# Patient Record
Sex: MALE | NOT HISPANIC OR LATINO | Employment: UNEMPLOYED | URBAN - METROPOLITAN AREA
[De-identification: names, ages, dates, MRNs, and addresses within clinical notes are randomized per-mention and may not be internally consistent; named-entity substitution may affect disease eponyms.]

---

## 2018-11-08 PROBLEM — M54.16 LUMBAR RADICULOPATHY: Status: ACTIVE | Noted: 2017-06-01

## 2018-11-08 PROBLEM — G43.009 COMMON MIGRAINE: Status: ACTIVE | Noted: 2018-11-08

## 2018-11-08 PROBLEM — I10 HYPERTENSION: Status: ACTIVE | Noted: 2018-11-08

## 2018-11-08 PROBLEM — M51.36 DEGENERATION OF INTERVERTEBRAL DISC OF LUMBAR REGION: Status: ACTIVE | Noted: 2018-11-08

## 2018-11-08 PROBLEM — F31.9 BIPOLAR AFFECTIVE PSYCHOSIS (HCC): Status: ACTIVE | Noted: 2018-11-08

## 2018-11-08 PROBLEM — E66.9 OBESITY: Status: ACTIVE | Noted: 2018-11-08

## 2018-11-08 PROBLEM — F32.A DEPRESSIVE DISORDER: Status: ACTIVE | Noted: 2018-11-08

## 2018-11-08 PROBLEM — L30.9 ECZEMA: Status: ACTIVE | Noted: 2018-11-08

## 2018-11-08 PROBLEM — G47.30 SLEEP APNEA: Status: ACTIVE | Noted: 2018-11-08

## 2018-11-08 PROBLEM — M51.369 DEGENERATION OF INTERVERTEBRAL DISC OF LUMBAR REGION: Status: ACTIVE | Noted: 2018-11-08

## 2018-11-08 PROBLEM — K21.9 GASTROESOPHAGEAL REFLUX DISEASE: Status: ACTIVE | Noted: 2018-11-08

## 2019-01-08 ENCOUNTER — HOSPITAL ENCOUNTER (EMERGENCY)
Facility: HOSPITAL | Age: 34
Discharge: HOME | End: 2019-01-08
Attending: EMERGENCY MEDICINE | Admitting: EMERGENCY MEDICINE
Payer: COMMERCIAL

## 2019-01-08 VITALS
WEIGHT: 230 LBS | SYSTOLIC BLOOD PRESSURE: 111 MMHG | TEMPERATURE: 97.1 F | DIASTOLIC BLOOD PRESSURE: 57 MMHG | HEART RATE: 68 BPM | RESPIRATION RATE: 17 BRPM | OXYGEN SATURATION: 97 %

## 2019-01-08 DIAGNOSIS — R10.9 PAIN IN THE ABDOMEN: Primary | ICD-10-CM

## 2019-01-08 LAB
ALBUMIN SERPL BCP-MCNC: 4.2 G/DL (ref 3–5.2)
ALP SERPL-CCNC: 55 U/L (ref 43–122)
ALT SERPL W P-5'-P-CCNC: 22 U/L (ref 9–52)
ANION GAP SERPL CALCULATED.3IONS-SCNC: 9 MMOL/L (ref 5–14)
AST SERPL W P-5'-P-CCNC: 23 U/L (ref 17–59)
BASOPHILS # BLD AUTO: 0.1 THOUSANDS/ΜL (ref 0–0.1)
BASOPHILS NFR BLD AUTO: 1 % (ref 0–1)
BILIRUB SERPL-MCNC: 0.6 MG/DL
BILIRUB UR QL STRIP: NEGATIVE
BUN SERPL-MCNC: 8 MG/DL (ref 5–25)
CALCIUM SERPL-MCNC: 9.3 MG/DL (ref 8.4–10.2)
CHLORIDE SERPL-SCNC: 105 MMOL/L (ref 97–108)
CLARITY UR: CLEAR
CO2 SERPL-SCNC: 25 MMOL/L (ref 22–30)
COLOR UR: ABNORMAL
CREAT SERPL-MCNC: 0.88 MG/DL (ref 0.7–1.5)
EOSINOPHIL # BLD AUTO: 0 THOUSAND/ΜL (ref 0–0.4)
EOSINOPHIL NFR BLD AUTO: 1 % (ref 0–6)
ERYTHROCYTE [DISTWIDTH] IN BLOOD BY AUTOMATED COUNT: 13.9 %
GFR SERPL CREATININE-BSD FRML MDRD: 112 ML/MIN/1.73SQ M
GLUCOSE SERPL-MCNC: 111 MG/DL (ref 70–99)
GLUCOSE UR STRIP-MCNC: NEGATIVE MG/DL
HCT VFR BLD AUTO: 43 % (ref 41–53)
HGB BLD-MCNC: 14.1 G/DL (ref 13.5–17.5)
HGB UR QL STRIP.AUTO: NEGATIVE
KETONES UR STRIP-MCNC: ABNORMAL MG/DL
LEUKOCYTE ESTERASE UR QL STRIP: NEGATIVE
LIPASE SERPL-CCNC: 26 U/L (ref 23–300)
LYMPHOCYTES # BLD AUTO: 1.1 THOUSANDS/ΜL (ref 0.5–4)
LYMPHOCYTES NFR BLD AUTO: 20 % (ref 25–45)
MCH RBC QN AUTO: 28.4 PG (ref 26–34)
MCHC RBC AUTO-ENTMCNC: 32.8 G/DL (ref 31–36)
MCV RBC AUTO: 87 FL (ref 80–100)
MONOCYTES # BLD AUTO: 0.3 THOUSAND/ΜL (ref 0.2–0.9)
MONOCYTES NFR BLD AUTO: 6 % (ref 1–10)
NEUTROPHILS # BLD AUTO: 4.1 THOUSANDS/ΜL (ref 1.8–7.8)
NEUTS SEG NFR BLD AUTO: 73 % (ref 45–65)
NITRITE UR QL STRIP: NEGATIVE
PH UR STRIP.AUTO: 5 [PH] (ref 4.5–8)
PLATELET # BLD AUTO: 264 THOUSANDS/UL (ref 150–450)
PMV BLD AUTO: 7.6 FL (ref 8.9–12.7)
POTASSIUM SERPL-SCNC: 4.1 MMOL/L (ref 3.6–5)
PROT SERPL-MCNC: 7.6 G/DL (ref 5.9–8.4)
PROT UR STRIP-MCNC: NEGATIVE MG/DL
RBC # BLD AUTO: 4.97 MILLION/UL (ref 4.5–5.9)
SODIUM SERPL-SCNC: 139 MMOL/L (ref 137–147)
SP GR UR STRIP.AUTO: 1.02 (ref 1–1.04)
UROBILINOGEN UA: NEGATIVE MG/DL
WBC # BLD AUTO: 5.6 THOUSAND/UL (ref 4.5–11)

## 2019-01-08 PROCEDURE — 36415 COLL VENOUS BLD VENIPUNCTURE: CPT | Performed by: PHYSICIAN ASSISTANT

## 2019-01-08 PROCEDURE — 80053 COMPREHEN METABOLIC PANEL: CPT | Performed by: PHYSICIAN ASSISTANT

## 2019-01-08 PROCEDURE — 85025 COMPLETE CBC W/AUTO DIFF WBC: CPT | Performed by: PHYSICIAN ASSISTANT

## 2019-01-08 PROCEDURE — 99284 EMERGENCY DEPT VISIT MOD MDM: CPT

## 2019-01-08 PROCEDURE — 81003 URINALYSIS AUTO W/O SCOPE: CPT | Performed by: PHYSICIAN ASSISTANT

## 2019-01-08 PROCEDURE — 96360 HYDRATION IV INFUSION INIT: CPT

## 2019-01-08 PROCEDURE — 83690 ASSAY OF LIPASE: CPT | Performed by: PHYSICIAN ASSISTANT

## 2019-01-08 RX ORDER — IBUPROFEN 600 MG/1
600 TABLET ORAL EVERY 6 HOURS PRN
Qty: 30 TABLET | Refills: 0 | Status: SHIPPED | OUTPATIENT
Start: 2019-01-08

## 2019-01-08 RX ORDER — SODIUM CHLORIDE 9 MG/ML
250 INJECTION, SOLUTION INTRAVENOUS CONTINUOUS
Status: DISCONTINUED | OUTPATIENT
Start: 2019-01-08 | End: 2019-01-08 | Stop reason: HOSPADM

## 2019-01-08 RX ADMIN — SODIUM CHLORIDE 250 ML/HR: 9 INJECTION, SOLUTION INTRAVENOUS at 12:45

## 2019-01-08 NOTE — ED PROVIDER NOTES
History  Chief Complaint   Patient presents with    Flank Pain     2 months of constant right flank pain  no injury/trauma  no urinary symptoms  no recent vomiting or diarrhea  also states occasionally has blood on paper after wiping  History provided by:  Patient   used: No    Medical Problem   Location:  Pt with right abdomen /flank pain  for 2 months     Severity:  Mild  Onset quality:  Gradual  Duration:  2 months  Timing:  Constant  Progression:  Unchanged  Chronicity:  New  Associated symptoms: abdominal pain    Associated symptoms: no chest pain, no congestion, no cough, no diarrhea, no ear pain, no fatigue, no fever, no headaches, no loss of consciousness, no myalgias, no nausea, no rash, no rhinorrhea, no shortness of breath, no sore throat, no vomiting and no wheezing        Prior to Admission Medications   Prescriptions Last Dose Informant Patient Reported? Taking? cetirizine (ZyrTEC) 10 mg tablet   Yes No   Sig: every 24 hours   fluticasone (FLONASE) 50 mcg/act nasal spray   Yes No   Sig: inhale 2 spray by intranasal route  every day in each nostril      Facility-Administered Medications: None       Past Medical History:   Diagnosis Date    Bipolar affective psychosis ([facility] [address] )     Depression     Lumbar radiculopathy     Migraine     Obesity     Sleep apnea        Past Surgical History:   Procedure Laterality Date    APPENDECTOMY         History reviewed  No pertinent family history  I have reviewed and agree with the history as documented  Social History   Substance Use Topics    Smoking status: Light Tobacco Smoker     Types: Cigarettes    Smokeless tobacco: Never Used      Comment: 2 cigarettes per day    Alcohol use Yes      Comment: rarely        Review of Systems   Constitutional: Negative  Negative for fatigue and fever  HENT: Negative  Negative for congestion, ear pain, rhinorrhea and sore throat  Eyes: Negative  Respiratory: Negative  Negative for cough, shortness of breath and wheezing  Cardiovascular: Negative  Negative for chest pain  Gastrointestinal: Positive for abdominal pain  Negative for diarrhea, nausea and vomiting  Endocrine: Negative  Genitourinary: Negative  Musculoskeletal: Negative  Negative for myalgias  Skin: Negative  Negative for rash  Allergic/Immunologic: Negative  Neurological: Negative  Negative for loss of consciousness and headaches  Hematological: Negative  Psychiatric/Behavioral: Negative  All other systems reviewed and are negative  Physical Exam  Physical Exam   Constitutional: He is oriented to person, place, and time  He appears well-developed and well-nourished  Discussed option of other testing with pt he states he has to leave  Pt is pain free   Will recheck with family doctor  Will return for ct scan if condition worsens    HENT:   Head: Normocephalic  Right Ear: External ear normal    Left Ear: External ear normal    Nose: Nose normal    Mouth/Throat: Oropharynx is clear and moist    Eyes: Pupils are equal, round, and reactive to light  Conjunctivae and EOM are normal    Neck: Normal range of motion  Neck supple  Cardiovascular: Normal rate, regular rhythm, normal heart sounds and intact distal pulses  Pulmonary/Chest: Effort normal and breath sounds normal    Abdominal: Soft  Bowel sounds are normal    Right abd pain and ruq pain   No groin no testes pain    Musculoskeletal: Normal range of motion  Neurological: He is alert and oriented to person, place, and time  Skin: Skin is warm  Psychiatric: He has a normal mood and affect  His behavior is normal    Nursing note and vitals reviewed        Vital Signs  ED Triage Vitals   Temperature Pulse Respirations Blood Pressure SpO2   01/08/19 1205 01/08/19 1205 01/08/19 1205 01/08/19 1205 01/08/19 1205   (!) 97 1 °F (36 2 °C) 91 16 167/92 95 %      Temp Source Heart Rate Source Patient Position - Orthostatic VS BP Location FiO2 (%)   01/08/19 1205 01/08/19 1337 01/08/19 1205 01/08/19 1205 --   Tympanic Monitor Lying Left arm       Pain Score       01/08/19 1337       No Pain           Vitals:    01/08/19 1205 01/08/19 1337   BP: 167/92 111/57   Pulse: 91 68   Patient Position - Orthostatic VS: Lying Lying       Visual Acuity      ED Medications  Medications - No data to display    Diagnostic Studies  Results Reviewed     Procedure Component Value Units Date/Time    Lipase [[id number]]  (Normal) Collected:  01/08/19 1241    Lab Status:  Final result Specimen:  Blood from Arm, Right Updated:  01/08/19 1326     Lipase 26 u/L     Comprehensive metabolic panel [[id number]]  (Abnormal) Collected:  01/08/19 1241    Lab Status:  Final result Specimen:  Blood from Arm, Right Updated:  01/08/19 1326     Sodium 139 mmol/L      Potassium 4 1 mmol/L      Chloride 105 mmol/L      CO2 25 mmol/L      ANION GAP 9 mmol/L      BUN 8 mg/dL      Creatinine 0 88 mg/dL      Glucose 111 (H) mg/dL      Calcium 9 3 mg/dL      AST 23 U/L      ALT 22 U/L      Alkaline Phosphatase 55 U/L      Total Protein 7 6 g/dL      Albumin 4 2 g/dL      Total Bilirubin 0 60 mg/dL      eGFR 112 ml/min/1 73sq m     Narrative:         National Kidney Disease Education Program recommendations are as follows:  GFR calculation is accurate only with a steady state creatinine  Chronic Kidney disease less than 60 ml/min/1 73 sq  meters  Kidney failure less than 15 ml/min/1 73 sq  meters      UA w Reflex to Microscopic w Reflex to Culture [[id number]]  (Abnormal) Collected:  01/08/19 1241    Lab Status:  Final result Specimen:  Urine from Urine, Clean Catch Updated:  01/08/19 1308     Color, UA Preeti (A)     Clarity, UA Clear     Specific Gravity, UA 1 025     pH, UA 5 0     Leukocytes, UA Negative     Nitrite, UA Negative     Protein, UA Negative mg/dl      Glucose, UA Negative mg/dl      Ketones, UA 5 (Trace) (A) mg/dl      Bilirubin, UA Negative     Blood, UA Negative UROBILINOGEN UA Negative mg/dL     CBC and differential [[id number]]  (Abnormal) Collected:  01/08/19 1241    Lab Status:  Final result Specimen:  Blood from Arm, Right Updated:  01/08/19 1258     WBC 5 60 Thousand/uL      RBC 4 97 Million/uL      Hemoglobin 14 1 g/dL      Hematocrit 43 0 %      MCV 87 fL      MCH 28 4 pg      MCHC 32 8 g/dL      RDW 13 9 %      MPV 7 6 (L) fL      Platelets 082 Thousands/uL      Neutrophils Relative 73 (H) %      Lymphocytes Relative 20 (L) %      Monocytes Relative 6 %      Eosinophils Relative 1 %      Basophils Relative 1 %      Neutrophils Absolute 4 10 Thousands/µL      Lymphocytes Absolute 1 10 Thousands/µL      Monocytes Absolute 0 30 Thousand/µL      Eosinophils Absolute 0 00 Thousand/µL      Basophils Absolute 0 10 Thousands/µL                  No orders to display              Procedures  Procedures       Phone Contacts  ED Phone Contact    ED Course                               MDM  CritCare Time    Disposition  Final diagnoses:   Pain in the abdomen     Time reflects when diagnosis was documented in both MDM as applicable and the Disposition within this note     Time User Action Codes Description Comment    1/8/2019  1:30 PM Maurizio Side  Add [R10 9] Pain in the abdomen       ED Disposition     ED Disposition Condition Comment    Discharge  Ray Jones  discharge to home/self care      Condition at discharge: Good        Follow-up Information     Follow up With Specialties Details Why Contact Info    Cristian Masters MD Family Medicine Schedule an appointment as soon as possible for a visit  [address] [place] [address]  [phone]            Discharge Medication List as of 1/8/2019  1:30 PM      START taking these medications    Details   ibuprofen (MOTRIN) 600 mg tablet Take 1 tablet (600 mg total) by mouth every 6 (six) hours as needed (pain), Starting Tue 1/8/2019, Print         CONTINUE these medications which have NOT CHANGED    Details cetirizine (ZyrTEC) 10 mg tablet every 24 hours, Starting Wed 9/27/2017, Historical Med      fluticasone (FLONASE) 50 mcg/act nasal spray inhale 2 spray by intranasal route  every day in each nostril, Historical Med           No discharge procedures on file      ED Provider  Electronically Signed by           Elvis Toro PA-C  01/08/19 8156

## 2019-01-08 NOTE — DISCHARGE INSTRUCTIONS
Abdominal Pain   WHAT YOU NEED TO KNOW:   Abdominal pain can be dull, achy, or sharp  You may have pain in one area of your abdomen, or in your entire abdomen  Your pain may be caused by a condition such as constipation, food sensitivity or poisoning, infection, or a blockage  Abdominal pain can also be from a hernia, appendicitis, or an ulcer  Liver, gallbladder, or kidney conditions can also cause abdominal pain  The cause of your abdominal pain may be unknown  DISCHARGE INSTRUCTIONS:   Return to the emergency department if:   · You have new chest pain or shortness of breath  · You have pulsing pain in your upper abdomen or lower back that suddenly becomes constant  · Your pain is in the right lower abdominal area and worsens with movement  · You have a fever over 100 4°F (38°C) or shaking chills  · You are vomiting and cannot keep food or liquids down  · Your pain does not improve or gets worse over the next 8 to 12 hours  · You see blood in your vomit or bowel movements, or they look black and tarry  · Your skin or the whites of your eyes turn yellow  · You are a woman and have a large amount of vaginal bleeding that is not your monthly period  Contact your healthcare provider if:   · You have pain in your lower back  · You are a man and have pain in your testicles  · You have pain when you urinate  · You have questions or concerns about your condition or care  Follow up with your healthcare provider within 24 hours or as directed:  Write down your questions so you remember to ask them during your visits  Medicines:   · Medicines  may be given to calm your stomach and prevent vomiting or to decrease pain  Ask how to take pain medicine safely  · Take your medicine as directed  Contact your healthcare provider if you think your medicine is not helping or if you have side effects  Tell him of her if you are allergic to any medicine   Keep a list of the medicines, vitamins, and herbs you take  Include the amounts, and when and why you take them  Bring the list or the pill bottles to follow-up visits  Carry your medicine list with you in case of an emergency  © 2017 [employer] Information is for End User's use only and may not be sold, redistributed or otherwise used for commercial purposes  All illustrations and images included in CareNotes® are the copyrighted property of A [employer]  or [employer]  The above information is an  only  It is not intended as medical advice for individual conditions or treatments  Talk to your doctor, nurse or pharmacist before following any medical regimen to see if it is safe and effective for you

## 2019-12-08 ENCOUNTER — OFFICE VISIT (OUTPATIENT)
Dept: URGENT CARE | Age: 34
End: 2019-12-08

## 2019-12-08 VITALS
HEART RATE: 107 BPM | HEIGHT: 71 IN | RESPIRATION RATE: 18 BRPM | OXYGEN SATURATION: 97 % | SYSTOLIC BLOOD PRESSURE: 144 MMHG | WEIGHT: 260 LBS | DIASTOLIC BLOOD PRESSURE: 76 MMHG | TEMPERATURE: 98.5 F | BODY MASS INDEX: 36.4 KG/M2

## 2019-12-08 DIAGNOSIS — Z02.4 DRIVER'S PERMIT PHYSICAL EXAMINATION: Primary | ICD-10-CM

## 2019-12-08 NOTE — PROGRESS NOTES
Pt here for drivers permit physical   Pt has uncontrolled sleep apnea and uncontrolled bipolar disorder  He has not seen his PCP in over a year    I told patient that I cannot perform this exam at this time and he should follow up with PCP

## 2021-08-11 ENCOUNTER — TELEPHONE (OUTPATIENT)
Dept: PSYCHIATRY | Facility: CLINIC | Age: 36
End: 2021-08-11

## 2021-09-27 ENCOUNTER — OFFICE VISIT (OUTPATIENT)
Dept: FAMILY MEDICINE CLINIC | Facility: CLINIC | Age: 36
End: 2021-09-27

## 2021-09-27 VITALS
BODY MASS INDEX: 38.83 KG/M2 | WEIGHT: 262.2 LBS | HEIGHT: 69 IN | OXYGEN SATURATION: 95 % | HEART RATE: 97 BPM | TEMPERATURE: 97.1 F | DIASTOLIC BLOOD PRESSURE: 82 MMHG | SYSTOLIC BLOOD PRESSURE: 128 MMHG | RESPIRATION RATE: 20 BRPM

## 2021-09-27 DIAGNOSIS — Z02.4 DRIVER'S PERMIT PE (PHYSICAL EXAMINATION): ICD-10-CM

## 2021-09-27 DIAGNOSIS — J45.990 EXERCISE-INDUCED ASTHMA: ICD-10-CM

## 2021-09-27 DIAGNOSIS — Z13.6 SCREENING FOR CARDIOVASCULAR CONDITION: ICD-10-CM

## 2021-09-27 DIAGNOSIS — S99.921A FOOT INJURY, RIGHT, INITIAL ENCOUNTER: ICD-10-CM

## 2021-09-27 DIAGNOSIS — F17.200 TOBACCO DEPENDENCE: ICD-10-CM

## 2021-09-27 DIAGNOSIS — H61.21 IMPACTED CERUMEN, RIGHT EAR: ICD-10-CM

## 2021-09-27 DIAGNOSIS — Z13.1 SCREENING FOR DIABETES MELLITUS: ICD-10-CM

## 2021-09-27 DIAGNOSIS — Z00.00 ANNUAL PHYSICAL EXAM: Primary | ICD-10-CM

## 2021-09-27 PROCEDURE — G0439 PPPS, SUBSEQ VISIT: HCPCS | Performed by: NURSE PRACTITIONER

## 2021-09-27 RX ORDER — ALBUTEROL SULFATE 90 UG/1
2 AEROSOL, METERED RESPIRATORY (INHALATION) EVERY 6 HOURS PRN
Qty: 18 G | Refills: 3 | Status: SHIPPED | OUTPATIENT
Start: 2021-09-27

## 2021-09-27 NOTE — PROGRESS NOTES
[facility]    NAME: Varghese Miranda  AGE: 39 y o  SEX: male  : 1985     DATE: 2021     Assessment and Plan:     Problem List Items Addressed This Visit        Respiratory    Exercise-induced asthma     -start rescue inhaler, advised to use before activity  -pt feels he sometimes has "air stuck" in right lung, will obtain cxr         Relevant Medications    albuterol (Ventolin HFA) 90 mcg/act inhaler    Other Relevant Orders    XR chest pa & lateral       Other    BMI 38 0-38 9,adult     BMI above normal  Counseling and plan as documented below   -Pt acknowledges understanding and agrees to lifestyle changes   -Additional handouts on diet/exercise provided   -Will monitor progress at next scheduled follow-up  Relevant Orders    Basic metabolic panel    CBC and Platelet    Tobacco dependence     Current smoker  Discussed cessation as documented below  Pharmacotherapy offered and handout provided  Declines pharmacotherapy at this time  Foot injury, right, initial encounter     No overlying skin changes or signs of obvious trauma  Maintains full ROM  Pain localized to lateral foot     -obtain baseline xray  -refer to PT if no resolution, possible podiatry         Relevant Orders    XR foot 3+ vw right      Other Visit Diagnoses     Annual physical exam    -  Primary    Screening for diabetes mellitus        Relevant Orders    Hemoglobin A1C    Screening for cardiovascular condition        Relevant Orders    Lipid panel    's permit PE (physical examination)        Impacted cerumen, right ear        Relevant Medications    carbamide peroxide (DEBROX) 6 5 % otic solution          Immunizations and preventive care screenings were discussed with patient today  Appropriate education was printed on patient's after visit summary      Counseling:  Alcohol/drug use: discussed moderation in alcohol intake, the recommendations for healthy alcohol use, and avoidance of illicit drug use  Dental Health: discussed importance of regular tooth brushing, flossing, and dental visits  Injury prevention: discussed safety/seat belts, safety helmets, smoke detectors, carbon dioxide detectors, and smoking near bedding or upholstery  Sexual health: discussed sexually transmitted diseases, partner selection, use of condoms, avoidance of unintended pregnancy, and contraceptive alternatives  · Exercise: the importance of regular exercise/physical activity was discussed  Recommend exercise 3-5 times per week for at least 30 minutes  BMI Counseling: Body mass index is 38 72 kg/m²  The BMI is above normal  Nutrition recommendations include decreasing portion sizes, encouraging healthy choices of fruits and vegetables, decreasing fast food intake, consuming healthier snacks, limiting drinks that contain sugar, moderation in carbohydrate intake, increasing intake of lean protein, reducing intake of saturated and trans fat and reducing intake of cholesterol  Exercise recommendations include moderate physical activity 150 minutes/week, exercising 3-5 times per week and obtaining a gym membership  No pharmacotherapy was ordered  Rationale for BMI follow-up plan is due to patient being overweight or obese  Tobacco Cessation Counseling: Tobacco cessation counseling was provided  The patient is sincerely urged to quit consumption of tobacco  He is not ready to quit tobacco  Medication options discussed  Patient refused medication  Return in 1 year (on 9/27/2022)  Chief Complaint:     Chief Complaint   Patient presents with    New Patient Visit     40 y/o     Physical Exam     DMV     Hypertension     "as per pt he doesnt have high BP"       History of Present Illness:     Adult Annual Physical   Patient here for a comprehensive physical exam  The patient reports no problems     Patient states he is mainly here for 's permit physical   Patient denies any recent illnesses  He does endorse recent injury to his right foot, states he stepped on a stone which caused a shooting pain up his right foot and leg  Since then he has been able to bear weight and is wearing an ankle sleeve for support  He is worried about having broken a bone in his foot requests an x-ray  He also states recently when he goes to the gym and works out he gets short of breath and wheezing  He denies a past history of asthma  He denies any syncope, dizziness  He has no other concerns at this time  Diet and Physical Activity  · Diet/Nutrition: well balanced diet  · Exercise: 1-2 times a week on average  Depression Screening  PHQ-9 Depression Screening    PHQ-9:   Frequency of the following problems over the past two weeks:           General Health  · Sleep: sleeps well  · Hearing: normal - bilateral   · Vision: no vision problems  · Dental: regular dental visits  [facility]  · History of STDs?: no      Review of Systems:     Review of Systems   Constitutional: Negative  HENT: Negative  Eyes: Negative  Respiratory: Positive for chest tightness, shortness of breath and wheezing  Cardiovascular: Negative  Gastrointestinal: Negative  Genitourinary: Negative  Musculoskeletal: Negative  Right foot pain   Neurological: Negative  Psychiatric/Behavioral: Negative         Past Medical History:     Past Medical History:   Diagnosis Date    [facility] ([facility] [address] )     Depression     Lumbar radiculopathy     Migraine     Obesity     Sleep apnea       Past Surgical History:     Past Surgical History:   Procedure Laterality Date    APPENDECTOMY        Social History:     Social History     Socioeconomic History    Marital status: Single     Spouse name: None    Number of children: None    Years of education: None    Highest education level: None   Occupational History    None Tobacco Use    Smoking status: Light Tobacco Smoker     Types: Cigarettes    Smokeless tobacco: Never Used    Tobacco comment: 2 cigarettes per day   Substance and Sexual Activity    Alcohol use: Not Currently     Comment: rarely    Drug use: No    Sexual activity: None   Other Topics Concern    None   Social History Narrative    None     Social Determinants of Health     Financial Resource Strain:     Difficulty of Paying Living Expenses:    Food Insecurity:     Worried About Running Out of Food in the Last Year:     Ran Out of Food in the Last Year:    Transportation Needs:     Lack of Transportation (Medical):  Lack of Transportation (Non-Medical):    Physical Activity:     Days of Exercise per Week:     Minutes of Exercise per Session:    Stress:     Feeling of Stress :    Social Connections:     Frequency of Communication with Friends and Family:     Frequency of Social Gatherings with Friends and Family:     Attends Muslim Services:     Active Member of Clubs or Organizations:     Attends Club or Organization Meetings:     Marital Status:    Intimate Partner Violence:     Fear of Current or Ex-Partner:     Emotionally Abused:     Physically Abused:     Sexually Abused:       Family History:     No family history on file     Current Medications:     Current Outpatient Medications   Medication Sig Dispense Refill    albuterol (Ventolin HFA) 90 mcg/act inhaler Inhale 2 puffs every 6 (six) hours as needed for wheezing 18 g 3    carbamide peroxide (DEBROX) 6 5 % otic solution Administer 5 drops to the right ear 2 (two) times a day 15 mL 0    cetirizine (ZyrTEC) 10 mg tablet every 24 hours      fluticasone (FLONASE) 50 mcg/act nasal spray inhale 2 spray by intranasal route  every day in each nostril      ibuprofen (MOTRIN) 600 mg tablet Take 1 tablet (600 mg total) by mouth every 6 (six) hours as needed (pain) (Patient not taking: Reported on 12/8/2019) 30 tablet 0     No current facility-administered medications for this visit  Allergies: Allergies   Allergen Reactions    American Cockroach Allergy Skin Test     Cat Hair Extract     Dog Epithelium     Dog Epithelium Allergy Skin Test Other (See Comments)    Olmesartan     Shellfish Allergy - Food Allergy Other (See Comments)    Shellfish-Derived Products - Food Allergy       Physical Exam:     /82 (BP Location: Left arm, Patient Position: Sitting, Cuff Size: Adult)   Pulse 97   Temp (!) 97 1 °F (36 2 °C) (Temporal)   Resp 20   Ht 5' 9" (1 753 m)   Wt 119 kg (262 lb 3 2 oz)   SpO2 95%   BMI 38 72 kg/m²     Physical Exam  Vitals and nursing note reviewed  Constitutional:       Appearance: Normal appearance  He is well-developed  HENT:      Head: Normocephalic and atraumatic  Right Ear: External ear normal  There is impacted cerumen  Left Ear: Tympanic membrane, ear canal and external ear normal  There is no impacted cerumen  Nose: Nose normal       Mouth/Throat:      Mouth: Mucous membranes are moist    Eyes:      Extraocular Movements: Extraocular movements intact  Conjunctiva/sclera: Conjunctivae normal       Pupils: Pupils are equal, round, and reactive to light  Cardiovascular:      Rate and Rhythm: Normal rate and regular rhythm  Pulses: Normal pulses  Heart sounds: Normal heart sounds  No murmur heard  Pulmonary:      Effort: Pulmonary effort is normal  No respiratory distress  Breath sounds: Normal breath sounds  Abdominal:      Palpations: Abdomen is soft  Tenderness: There is no abdominal tenderness  Musculoskeletal:         General: Normal range of motion  Cervical back: Normal range of motion and neck supple  Skin:     General: Skin is warm and dry  Capillary Refill: Capillary refill takes less than 2 seconds  Neurological:      General: No focal deficit present        Mental Status: He is alert and oriented to person, place, and time    Psychiatric:         Mood and Affect: Mood normal          Behavior: Behavior normal           [address]

## 2021-09-27 NOTE — ASSESSMENT & PLAN NOTE
No overlying skin changes or signs of obvious trauma  Maintains full ROM    Pain localized to lateral foot     -obtain baseline xray  -refer to PT if no resolution, possible podiatry

## 2021-09-27 NOTE — ASSESSMENT & PLAN NOTE
-start rescue inhaler, advised to use before activity  -pt feels he sometimes has "air stuck" in right lung, will obtain cxr

## 2021-09-27 NOTE — PATIENT INSTRUCTIONS
Wellness Visit for Adults   AMBULATORY CARE:   A wellness visit  is when you see your healthcare provider to get screened for health problems  Your healthcare provider will also give you advice on how to stay healthy  Write down your questions so you remember to ask them  Ask your healthcare provider how often you should have a wellness visit  What happens at a wellness visit:  Your healthcare provider will ask about your health, and your family history of health problems  This includes high blood pressure, heart disease, and cancer  He or she will ask if you have symptoms that concern you, if you smoke, and about your mood  You may also be asked about your intake of medicines, supplements, food, and alcohol  Any of the following may be done:  · Your weight  will be checked  Your height may also be checked so your body mass index (BMI) can be calculated  Your BMI shows if you are at a healthy weight  · Your blood pressure  and heart rate will be checked  Your temperature may also be checked  · Blood and urine tests  may be done  Blood tests may be done to check your cholesterol levels  Abnormal cholesterol levels increase your risk for heart disease and stroke  You may also need a blood or urine test to check for diabetes if you are at increased risk  Urine tests may be done to look for signs of an infection or kidney disease  · A physical exam  includes checking your heartbeat and lungs with a stethoscope  Your healthcare provider may also check your skin to look for sun damage  · Screening tests  may be recommended  A screening test is done to check for diseases that may not cause symptoms  The screening tests you may need depend on your age, gender, family history, and lifestyle habits  For example, colorectal screening may be recommended if you are 48years old or older  Screening tests you need if you are a woman:   · A Pap smear  is used to screen for cervical cancer   Pap smears are usually done every 3 to 5 years depending on your age  You may need them more often if you have had abnormal Pap smear test results in the past  Ask your healthcare provider how often you should have a Pap smear  · A mammogram  is an x-ray of your breasts to screen for breast cancer  Experts recommend mammograms every 2 years starting at age 48 years  You may need a mammogram at age 52 years or younger if you have an increased risk for breast cancer  Talk to your healthcare provider about when you should start having mammograms and how often you need them  Vaccines you may need:   · Get an influenza vaccine  every year  The influenza vaccine protects you from the flu  Several types of viruses cause the flu  The viruses change over time, so new vaccines are made each year  · Get a tetanus-diphtheria (Td) booster vaccine  every 10 years  This vaccine protects you against tetanus and diphtheria  Tetanus is a severe infection that may cause painful muscle spasms and lockjaw  Diphtheria is a severe bacterial infection that causes a thick covering in the back of your mouth and throat  · Get a human papillomavirus (HPV) vaccine  if you are female and aged 23 to 32 or male 23 to 24 and never received it  This vaccine protects you from HPV infection  HPV is the most common infection spread by sexual contact  HPV may also cause vaginal, penile, and anal cancers  · Get a pneumococcal vaccine  if you are aged 72 years or older  The pneumococcal vaccine is an injection given to protect you from pneumococcal disease  Pneumococcal disease is an infection caused by pneumococcal bacteria  The infection may cause pneumonia, meningitis, or an ear infection  · Get a shingles vaccine  if you are 60 or older, even if you have had shingles before  The shingles vaccine is an injection to protect you from the varicella-zoster virus  This is the same virus that causes chickenpox   Shingles is a painful rash that develops in people who had chickenpox or have been exposed to the virus  How to eat healthy:  My Plate is a model for planning healthy meals  It shows the types and amounts of foods that should go on your plate  Fruits and vegetables make up about half of your plate, and grains and protein make up the other half  A serving of dairy is included on the side of your plate  The amount of calories and serving sizes you need depends on your age, gender, weight, and height  Examples of healthy foods are listed below:  · Eat a variety of vegetables  such as dark green, red, and orange vegetables  You can also include canned vegetables low in sodium (salt) and frozen vegetables without added butter or sauces  · Eat a variety of fresh fruits , canned fruit in 100% juice, frozen fruit, and dried fruit  · Include whole grains  At least half of the grains you eat should be whole grains  Examples include whole-wheat bread, wheat pasta, brown rice, and whole-grain cereals such as oatmeal     · Eat a variety of protein foods such as seafood (fish and shellfish), lean meat, and poultry without skin (turkey and chicken)  Examples of lean meats include pork leg, shoulder, or tenderloin, and beef round, sirloin, tenderloin, and extra lean ground beef  Other protein foods include eggs and egg substitutes, beans, peas, soy products, nuts, and seeds  · Choose low-fat dairy products such as skim or 1% milk or low-fat yogurt, cheese, and cottage cheese  · Limit unhealthy fats  such as butter, hard margarine, and shortening  Exercise:  Exercise at least 30 minutes per day on most days of the week  Some examples of exercise include walking, biking, dancing, and swimming  You can also fit in more physical activity by taking the stairs instead of the elevator or parking farther away from stores  Include muscle strengthening activities 2 days each week  Regular exercise provides many health benefits   It helps you manage your weight, and decreases your risk for type 2 diabetes, heart disease, stroke, and high blood pressure  Exercise can also help improve your mood  Ask your healthcare provider about the best exercise plan for you  General health and safety guidelines:   · Do not smoke  Nicotine and other chemicals in cigarettes and cigars can cause lung damage  Ask your healthcare provider for information if you currently smoke and need help to quit  E-cigarettes or smokeless tobacco still contain nicotine  Talk to your healthcare provider before you use these products  · Limit alcohol  A drink of alcohol is 12 ounces of beer, 5 ounces of wine, or 1½ ounces of liquor  · Lose weight, if needed  Being overweight increases your risk of certain health conditions  These include heart disease, high blood pressure, type 2 diabetes, and certain types of cancer  · Protect your skin  Do not sunbathe or use tanning beds  Use sunscreen with a SPF 15 or higher  Apply sunscreen at least 15 minutes before you go outside  Reapply sunscreen every 2 hours  Wear protective clothing, hats, and sunglasses when you are outside  · Drive safely  Always wear your seatbelt  Make sure everyone in your car wears a seatbelt  A seatbelt can save your life if you are in an accident  Do not use your cell phone when you are driving  This could distract you and cause an accident  Pull over if you need to make a call or send a text message  · Practice safe sex  Use latex condoms if are sexually active and have more than one partner  Your healthcare provider may recommend screening tests for sexually transmitted infections (STIs)  · Wear helmets, lifejackets, and protective gear  Always wear a helmet when you ride a bike or motorcycle, go skiing, or play sports that could cause a head injury  Wear protective equipment when you play sports  Wear a lifejacket when you are on a boat or doing water sports      © Copyright [employer] 2021 Information is for End User's use only and may not be sold, redistributed or otherwise used for commercial purposes  All illustrations and images included in CareNotes® are the copyrighted property of A D A M [employer] Inc  or Isatu Beard  The above information is an  only  It is not intended as medical advice for individual conditions or treatments  Talk to your doctor, nurse or pharmacist before following any medical regimen to see if it is safe and effective for you  Cigarette Smoking and Your Health   AMBULATORY CARE:   Risks to your health if you smoke:  Nicotine and other chemicals found in tobacco and e-cigarettes can damage every cell in your body  Even if you are a light smoker, you have an increased risk for cancer, heart disease, and lung disease  If you are pregnant or have diabetes, smoking increases your risk for complications  Nicotine can affect an adolescent's developing brain  This can lead to trouble thinking, learning, or paying attention  Benefits to your health if you stop smoking:   · You decrease respiratory symptoms such as coughing, wheezing, and shortness of breath  · You reduce your risk for cancers of the lung, mouth, throat, kidney, bladder, pancreas, stomach, and cervix  If you already have cancer, you increase the benefits of chemotherapy  You also reduce your risk for cancer returning or a second cancer from developing  · You reduce your risk for heart disease, blood clots, heart attack, and stroke  · You reduce your risk for lung infections, and diseases such as pneumonia, asthma, chronic bronchitis, and emphysema  · Your circulation improves  More oxygen can be delivered to your body  If you have diabetes, you lower your risk for complications, such as kidney, artery, and eye diseases  You also lower your risk for nerve damage  Nerve damage can lead to amputations, poor vision, and blindness      · You improve your body's ability to heal and to fight infections  · An adolescent can help his or her brain and body develop in a healthy way  Talk to your adolescent about all the health risks of nicotine  If you can, start talking about nicotine when your child is younger than 12 years  This may make it easier for him or her not to start using nicotine as a teenager or adult  Explain to him or her that it is best never to start  It can be hard to try to quit later  Benefits to the health of others if you stop smoking:  Tobacco is harmful to nonsmokers who breathe in your secondhand smoke  The following are ways the health of others around you may improve when you stop smoking:  · You lower the risks for lung cancer and heart disease in nonsmoking adults  · If you are pregnant, you lower the risk for miscarriage, early delivery, low birth weight, and stillbirth  You also lower your baby's risk for SIDS, obesity, developmental delay, and neurobehavioral problems, such as ADHD  · If you have children, you lower their risk for ear infections, colds, pneumonia, bronchitis, and asthma  Follow up with your doctor as directed:  Write down your questions so you remember to ask them during your visits  For support and more information:   · American [employer] Association  [address] [place] [address],[address]  [address]  Phone: [facility]  Phone: 0- 241 - 808-0105  Web Address: [employer]    · Smokefree  gov  Phone: 2- 050 - 919-7256  Web Address: www smokefree  [facility] [address] 2021 Information is for End User's use only and may not be sold, redistributed or otherwise used for commercial purposes  All illustrations and images included in CareNotes® are the copyrighted property of [employer]  or [address] [place] [address]  The above information is an  only  It is not intended as medical advice for individual conditions or treatments   Talk to your doctor, nurse or pharmacist before following any medical regimen to see if it is safe and effective for you  Cholesterol and Your Health   AMBULATORY CARE:   Cholesterol  is a waxy, fat-like substance  Your body uses cholesterol to make hormones and new cells, and to protect nerves  Cholesterol is made by your body  It also comes from certain foods you eat, such as meat and dairy products  Your healthcare provider can help you set goals for your cholesterol levels  He or she can help you create a plan to meet your goals  Cholesterol level goals: Your cholesterol level goals depend on your risk for heart disease, your age, and your other health conditions  The following are general guidelines:  · Total cholesterol  includes low-density lipoprotein (LDL), high-density lipoprotein (HDL), and triglyceride levels  The total cholesterol level should be lower than 200 mg/dL and is best at about 150 mg/dL  · LDL cholesterol  is called bad cholesterol  because it forms plaque in your arteries  As plaque builds up, your arteries become narrow, and less blood flows through  When plaque decreases blood flow to your heart, you may have chest pain  If plaque completely blocks an artery that brings blood to your heart, you may have a heart attack  Plaque can break off and form blood clots  Blood clots may block arteries in your brain and cause a stroke  The level should be less than 130 mg/dL and is best at about 100 mg/dL  · HDL cholesterol  is called good cholesterol  because it helps remove LDL cholesterol from your arteries  It does this by attaching to LDL cholesterol and carrying it to your liver  Your liver breaks down LDL cholesterol so your body can get rid of it  High levels of HDL cholesterol can help prevent a heart attack and stroke  Low levels of HDL cholesterol can increase your risk for heart disease, heart attack, and stroke  The level should be 60 mg/dL or higher  · Triglycerides  are a type of fat that store energy from foods you eat   High levels of triglycerides also cause plaque buildup  This can increase your risk for a heart attack or stroke  If your triglyceride level is high, your LDL cholesterol level may also be high  The level should be less than 150 mg/dL  Any of the following can increase your risk for high cholesterol:   · Smoking cigarettes    · Being overweight or obese, or not getting enough exercise    · Drinking large amounts of alcohol    · A medical condition such as hypertension (high blood pressure) or diabetes    · Certain genes passed from your parents to you    · Age older than 65 years    What you need to know about having your cholesterol levels checked: Adults 21to 39years of age should have their cholesterol levels checked every 4 to 6 years  Adults 45 years or older should have their cholesterol checked every 1 to 2 years  You may need your cholesterol checked more often, or at a younger age, if you have risk factors for heart disease  You may also need to have your cholesterol checked more often if you have other health conditions, such as diabetes  Blood tests are used to check cholesterol levels  Blood tests measure your levels of triglycerides, LDL cholesterol, and HDL cholesterol  How healthy fats affect your cholesterol levels:  Healthy fats, also called unsaturated fats, help lower LDL cholesterol and triglyceride levels  Healthy fats include the following:  · Monounsaturated fats  are found in foods such as olive oil, canola oil, avocado, nuts, and olives  · Polyunsaturated fats,  such as omega 3 fats, are found in fish, such as salmon, trout, and tuna  They can also be found in plant foods such as flaxseed, walnuts, and soybeans  How unhealthy fats affect your cholesterol levels:  Unhealthy fats increase LDL cholesterol and triglyceride levels  They are found in foods high in cholesterol, saturated fat, and trans fat:  · Cholesterol  is found in eggs, dairy, and meat      · Saturated fat  is found in butter, cheese, ice cream, whole milk, and coconut oil  Saturated fat is also found in meat, such as sausage, hot dogs, and bologna  · Trans fat  is found in liquid oils and is used in fried and baked foods  Foods that contain trans fats include chips, crackers, muffins, sweet rolls, microwave popcorn, and cookies  Treatment  for high cholesterol will also decrease your risk of heart disease, heart attack, and stroke  Treatment may include any of the following:  · Lifestyle changes  may include food, exercise, weight loss, and quitting smoking  You may also need to decrease the amount of alcohol you drink  Your healthcare provider will want you to start with lifestyle changes  Other treatment may be added if lifestyle changes are not enough  Your healthcare provider may recommend you work with a team to manage hyperlipidemia  The team may include medical experts such as a dietitian, an exercise or physical therapist, and a behavior therapist  Your family members may be included in helping you create lifestyle changes  · Medicines  may be given to lower your LDL cholesterol, triglyceride levels, or total cholesterol level  You may need medicines to lower your cholesterol if any of the following is true:    ? You have a history of stroke, TIA, unstable angina, or a heart attack  ? Your LDL cholesterol level is 190 mg/dL or higher  ? You are age 36 to 76 years, have diabetes or heart disease risk factors, and your LDL cholesterol is 70 mg/dL or higher  · Supplements  include fish oil, red yeast rice, and garlic  Fish oil may help lower your triglyceride and LDL cholesterol levels  It may also increase your HDL cholesterol level  Red yeast rice may help decrease your total cholesterol level and LDL cholesterol level  Garlic may help lower your total cholesterol level  Do not take any supplements without talking to your healthcare provider      Food changes you can make to lower your cholesterol levels:  A dietitian can help you create a healthy eating plan  He or she can show you how to read food labels and choose foods low in saturated fat, trans fats, and cholesterol  · Decrease the total amount of fat you eat  Choose lean meats, fat-free or 1% fat milk, and low-fat dairy products, such as yogurt and cheese  Try to limit or avoid red meats  Limit or do not eat fried foods or baked goods, such as cookies  · Replace unhealthy fats with healthy fats  Cook foods in olive oil or canola oil  Choose soft margarines that are low in saturated fat and trans fat  Seeds, nuts, and avocados are other examples of healthy fats  · Eat foods with omega-3 fats  Examples include salmon, tuna, mackerel, walnuts, and flaxseed  Eat fish 2 times per week  Pregnant women should not eat fish that have high levels of mercury, such as shark, swordfish, and jyoti mackerel  · Increase the amount of high-fiber foods you eat  High-fiber foods can help lower your LDL cholesterol  Aim to get between 20 and 30 grams of fiber each day  Fruits and vegetables are high in fiber  Eat at least 5 servings each day  Other high-fiber foods are whole-grain or whole-wheat breads, pastas, or cereals, and brown rice  Eat 3 ounces of whole-grain foods each day  Increase fiber slowly  You may have abdominal discomfort, bloating, and gas if you add fiber to your diet too quickly  · Eat healthy protein foods  Examples include low-fat dairy products, skinless chicken and turkey, fish, and nuts  · Limit foods and drinks that are high in sugar  Your dietitian or healthcare provider can help you create daily limits for high-sugar foods and drinks  The limit may be lower if you have diabetes or another health condition  Limits can also help you lose weight if needed  Lifestyle changes you can make to lower your cholesterol levels:   · Maintain a healthy weight  Ask your healthcare provider what a healthy weight is for you   Ask him or her to help you create a weight loss plan if needed  Weight loss can decrease your total cholesterol and triglyceride levels  Weight loss may also help keep your blood pressure at a healthy level  · Be physically active throughout the day  Physical activity, such as exercise, can help lower your total cholesterol level and maintain a healthy weight  Physical activity can also help increase your HDL cholesterol level  Work with your healthcare provider to create an program that is right for you  Get at least 30 to 40 minutes of moderate physical activity most days of the week  Examples of exercise include brisk walking, swimming, or biking  Also include strength training at least 2 times each week  Your healthcare providers can help you create a physical activity plan  · Do not smoke  Nicotine and other chemicals in cigarettes and cigars can raise your cholesterol levels  Ask your healthcare provider for information if you currently smoke and need help to quit  E-cigarettes or smokeless tobacco still contain nicotine  Talk to your healthcare provider before you use these products  · Limit or do not drink alcohol  Alcohol can increase your triglyceride levels  Ask your healthcare provider before you drink alcohol  Ask how much is okay for you to drink in 24 hours or 1 week  Follow up with your doctor as directed:  Write down your questions so you remember to ask them during your visits  © Copyright [employer] 2021 Information is for End User's use only and may not be sold, redistributed or otherwise used for commercial purposes  All illustrations and images included in CareNotes® are the copyrighted property of [employer]  or Isatu Paul   The above information is an  only  It is not intended as medical advice for individual conditions or treatments  Talk to your doctor, nurse or pharmacist before following any medical regimen to see if it is safe and effective for you

## 2022-08-29 ENCOUNTER — APPOINTMENT (OUTPATIENT)
Dept: RADIOLOGY | Facility: HOSPITAL | Age: 37
End: 2022-08-29
Payer: MEDICARE

## 2022-08-29 ENCOUNTER — HOSPITAL ENCOUNTER (EMERGENCY)
Facility: HOSPITAL | Age: 37
Discharge: HOME | End: 2022-08-29
Attending: EMERGENCY MEDICINE
Payer: MEDICARE

## 2022-08-29 ENCOUNTER — TELEPHONE (OUTPATIENT)
Dept: OTHER | Facility: OTHER | Age: 37
End: 2022-08-29

## 2022-08-29 VITALS
HEART RATE: 87 BPM | RESPIRATION RATE: 20 BRPM | SYSTOLIC BLOOD PRESSURE: 152 MMHG | TEMPERATURE: 99 F | HEIGHT: 71 IN | BODY MASS INDEX: 35.68 KG/M2 | WEIGHT: 254.85 LBS | DIASTOLIC BLOOD PRESSURE: 87 MMHG | OXYGEN SATURATION: 97 %

## 2022-08-29 DIAGNOSIS — S60.031A CONTUSION OF RIGHT MIDDLE FINGER WITHOUT DAMAGE TO NAIL, INITIAL ENCOUNTER: ICD-10-CM

## 2022-08-29 DIAGNOSIS — S60.10XA SUBUNGUAL HEMATOMA OF DIGIT OF HAND, INITIAL ENCOUNTER: ICD-10-CM

## 2022-08-29 DIAGNOSIS — S60.141A CONTUSION OF RIGHT RING FINGER WITH DAMAGE TO NAIL, INITIAL ENCOUNTER: Primary | ICD-10-CM

## 2022-08-29 PROCEDURE — 73130 X-RAY EXAM OF HAND: CPT

## 2022-08-29 PROCEDURE — 99283 EMERGENCY DEPT VISIT LOW MDM: CPT

## 2022-08-29 PROCEDURE — 96372 THER/PROPH/DIAG INJ SC/IM: CPT

## 2022-08-29 PROCEDURE — 99284 EMERGENCY DEPT VISIT MOD MDM: CPT | Performed by: EMERGENCY MEDICINE

## 2022-08-29 RX ORDER — KETOROLAC TROMETHAMINE 30 MG/ML
15 INJECTION, SOLUTION INTRAMUSCULAR; INTRAVENOUS ONCE
Status: COMPLETED | OUTPATIENT
Start: 2022-08-29 | End: 2022-08-29

## 2022-08-29 RX ADMIN — KETOROLAC TROMETHAMINE 15 MG: 30 INJECTION, SOLUTION INTRAMUSCULAR; INTRAVENOUS at 15:06

## 2022-08-29 NOTE — Clinical Note
Amada Mccullough was seen and treated in our emergency department on 8/29/2022  Diagnosis:     Brigid Mclean  is off the rest of the shift today, may return to work on return date  He may return on this date: 09/05/2022    Can return sooner if feeling okay      If you have any questions or concerns, please don't hesitate to call        Will MD Manuel    ______________________________           _______________          _______________  Hospital Representative                              Date                                Time

## 2022-08-29 NOTE — ED PROVIDER NOTES
History  Chief Complaint   Patient presents with    Hand Pain     Force applied to door of car and right 4th finger tip bruise looking and 3rd finger hurts; states happened yesterday  45 yo male presenting due to R 3rd and 4th finger pain, swelling and bruising  Pt states yesterday, he was lifting up a door to a car and his fingers got stuck on a navarro causing imediate pain  Pain described as throbbing, constant  Pt took 5 x 500 mg tylenol last night for his pain which helped  Promotes tingling of the fingers on dorsal and palmar aspect  Otherwise feels in his normal state of health  Prior to Admission Medications   Prescriptions Last Dose Informant Patient Reported? Taking? albuterol (Ventolin HFA) 90 mcg/act inhaler   No No   Sig: Inhale 2 puffs every 6 (six) hours as needed for wheezing      Facility-Administered Medications: None       Past Medical History:   Diagnosis Date    Bipolar affective psychosis ([facility] [address] )     Depression     Lumbar radiculopathy     Migraine     Obesity     Sleep apnea        Past Surgical History:   Procedure Laterality Date    APPENDECTOMY         History reviewed  No pertinent family history  I have reviewed and agree with the history as documented  E-Cigarette/Vaping    E-Cigarette Use Never User      E-Cigarette/Vaping Substances     Social History     Tobacco Use    Smoking status: Light Tobacco Smoker     Types: Cigarettes    Smokeless tobacco: Never Used    Tobacco comment: 2 cigarettes per day   Vaping Use    Vaping Use: Never used   Substance Use Topics    Alcohol use: Not Currently     Comment: rarely    Drug use: No        Review of Systems   Constitutional: Negative for chills and fever  HENT: Negative for ear pain and sore throat  Eyes: Negative for pain and visual disturbance  Respiratory: Negative for cough and shortness of breath  Cardiovascular: Negative for chest pain and palpitations     Gastrointestinal: Negative for abdominal pain and vomiting  Genitourinary: Negative for dysuria and hematuria  Musculoskeletal: Negative for arthralgias and back pain  Right 3rd and 4th finger pain, swelling, bruising, tingling   Skin: Negative for color change and rash  Neurological: Negative for seizures and syncope  All other systems reviewed and are negative  Physical Exam  ED Triage Vitals   Temperature Pulse Respirations Blood Pressure SpO2   08/29/22 1427 08/29/22 1427 08/29/22 1427 08/29/22 1427 08/29/22 1427   99 °F (37 2 °C) 87 20 152/87 97 %      Temp Source Heart Rate Source Patient Position - Orthostatic VS BP Location FiO2 (%)   08/29/22 1427 08/29/22 1427 08/29/22 1427 08/29/22 1427 --   Tympanic Monitor Sitting Left arm       Pain Score       08/29/22 1428       8             Orthostatic Vital Signs  Vitals:    08/29/22 1427   BP: 152/87   Pulse: 87   Patient Position - Orthostatic VS: Sitting       Physical Exam  Vitals and nursing note reviewed  Constitutional:       Appearance: He is well-developed  HENT:      Head: Normocephalic and atraumatic  Eyes:      Conjunctiva/sclera: Conjunctivae normal    Cardiovascular:      Rate and Rhythm: Normal rate and regular rhythm  Pulses: Normal pulses  Radial pulses are 2+ on the right side and 2+ on the left side  Heart sounds: Normal heart sounds  No murmur heard  Pulmonary:      Effort: Pulmonary effort is normal  No respiratory distress  Breath sounds: Normal breath sounds  Abdominal:      Palpations: Abdomen is soft  Tenderness: There is no abdominal tenderness  Musculoskeletal:      Cervical back: Neck supple  Comments: R 3rd and 4th finger pain near DIP and tip of fingers  R 4th finger: subungual hematoma  Decreased sensation in both fingers  Pt holding hand against gravity, partially flexed  Can flex, extend, abduct and adduct all 5 fingers  Radial pulse 2+  Skin:     General: Skin is warm and dry        Capillary Refill: Capillary refill takes less than 2 seconds  Neurological:      Mental Status: He is alert  ED Medications  Medications   ketorolac (TORADOL) injection 15 mg (15 mg Intramuscular Given 8/29/22 1146)       Diagnostic Studies  Results Reviewed     None                 XR hand 3+ views RIGHT    (Results Pending)         Procedures  Procedures      ED Course                             SBIRT 20yo+    Flowsheet Row Most Recent Value   SBIRT (25 yo +)    In order to provide better care to our patients, we are screening all of our patients for alcohol and drug use  Would it be okay to ask you these screening questions? Yes Filed at: 08/29/2022 1449   Initial Alcohol Screen: US AUDIT-C     1  How often do you have a drink containing alcohol? 0 Filed at: 08/29/2022 1449   2  How many drinks containing alcohol do you have on a typical day you are drinking? 0 Filed at: 08/29/2022 1449   3a  Male UNDER 65: How often do you have five or more drinks on one occasion? 0 Filed at: 08/29/2022 1449   Audit-C Score 0 Filed at: 08/29/2022 1449   OLIVIER: How many times in the past year have you    Used an illegal drug or used a prescription medication for non-medical reasons? Never Filed at: 08/29/2022 1449                MDM  Number of Diagnoses or Management Options  Contusion of right middle finger without damage to nail, initial encounter  Contusion of right ring finger with damage to nail, initial encounter  Subungual hematoma of digit of hand, initial encounter  Diagnosis management comments: 43-year-old male with right 3rd and 4th finger pain, swelling, bruising after his fingers were stuck on the latch of a door  X-ray right hand did not reveal any fractures or dislocations  4th finger underwent trephination with successful removal of subungual hematoma  Patient was given Toradol in the ED for pain  Follow-up with PCP  Ortho referral was also given  Strict return precautions         Amount and/or Complexity of Data Reviewed  Tests in the radiology section of CPT®: ordered and reviewed  Tests in the medicine section of CPT®: ordered and reviewed  Independent visualization of images, tracings, or specimens: yes    Risk of Complications, Morbidity, and/or Mortality  Presenting problems: low  Diagnostic procedures: low  Management options: low    Patient Progress  Patient progress: stable      Disposition  Final diagnoses:   Contusion of right ring finger with damage to nail, initial encounter   Contusion of right middle finger without damage to nail, initial encounter   Subungual hematoma of digit of hand, initial encounter - R ring/4th finger     Time reflects when diagnosis was documented in both MDM as applicable and the Disposition within this note     Time User Action Codes Description Comment    8/29/2022  3:15 PM Darlis Goldmann Add [S60 141A] Contusion of right ring finger with damage to nail, initial encounter     8/29/2022  3:15 PM Darlis Goldmann Add [S60 031A] Contusion of right middle finger without damage to nail, initial encounter     8/29/2022  3:15 PM Ras Joshi Add [S60 10XA] Subungual hematoma of digit of hand, initial encounter     8/29/2022  3:16 PM Ras Joshi Modify [S60 10XA] Subungual hematoma of digit of hand, initial encounter R ring/4th finger      ED Disposition     ED Disposition   Discharge    Condition   Stable    Date/Time   Mon Aug 29, 2022  3:17 PM    Comment   Diaz Avendano  discharge to home/self care                 Follow-up Information     Follow up With Specialties Details Why Contact Info Additional Information    MARY Guy Nurse Practitioner, Family Medicine Schedule an appointment as soon as possible for a visit  for follow up [address] [place] [phone]  [address] [place] [address] [facility] Schedule an appointment as soon as possible for a visit  schedule appointment with ortho if fingers do not get better [address] [phone]  [address] [place] [address] Specialists hospitals [facility], [address], [place], [employer] [facility]          Discharge Medication List as of 8/29/2022  3:17 PM      CONTINUE these medications which have NOT CHANGED    Details   albuterol (Ventolin HFA) 90 mcg/act inhaler Inhale 2 puffs every 6 (six) hours as needed for wheezing, Starting Mon 9/27/2021, Normal      carbamide peroxide (DEBROX) 6 5 % otic solution Administer 5 drops to the right ear 2 (two) times a day, Starting Mon 9/27/2021, Normal      cetirizine (ZyrTEC) 10 mg tablet every 24 hours, Starting Wed 9/27/2017, Historical Med      fluticasone (FLONASE) 50 mcg/act nasal spray inhale 2 spray by intranasal route  every day in each nostril, Historical Med      ibuprofen (MOTRIN) 600 mg tablet Take 1 tablet (600 mg total) by mouth every 6 (six) hours as needed (pain), Starting Tue 1/8/2019, Print           No discharge procedures on file  PDMP Review     None           ED Provider  Attending physically available and evaluated Inocenciosandro Feliz    RENETTA managed the patient along with the ED Attending      Electronically Signed by         Tegan Perales MD  08/29/22 0574

## 2022-09-21 ENCOUNTER — OFFICE VISIT (OUTPATIENT)
Dept: FAMILY MEDICINE CLINIC | Facility: CLINIC | Age: 37
End: 2022-09-21

## 2022-09-21 VITALS
DIASTOLIC BLOOD PRESSURE: 86 MMHG | RESPIRATION RATE: 19 BRPM | WEIGHT: 254 LBS | OXYGEN SATURATION: 97 % | HEIGHT: 71 IN | BODY MASS INDEX: 35.56 KG/M2 | SYSTOLIC BLOOD PRESSURE: 138 MMHG | HEART RATE: 95 BPM | TEMPERATURE: 96.9 F

## 2022-09-21 DIAGNOSIS — Z53.20 HIV SCREENING DECLINED: ICD-10-CM

## 2022-09-21 DIAGNOSIS — Z02.4 DRIVER'S PERMIT PHYSICAL EXAMINATION: ICD-10-CM

## 2022-09-21 DIAGNOSIS — E66.01 CLASS 2 SEVERE OBESITY DUE TO EXCESS CALORIES WITH SERIOUS COMORBIDITY AND BODY MASS INDEX (BMI) OF 35.0 TO 35.9 IN ADULT (HCC): ICD-10-CM

## 2022-09-21 DIAGNOSIS — Z53.20 SCREENING FOR HEPATITIS C DECLINED: ICD-10-CM

## 2022-09-21 DIAGNOSIS — G43.009 MIGRAINE WITHOUT AURA AND WITHOUT STATUS MIGRAINOSUS, NOT INTRACTABLE: ICD-10-CM

## 2022-09-21 DIAGNOSIS — F17.200 TOBACCO DEPENDENCE: ICD-10-CM

## 2022-09-21 DIAGNOSIS — F31.9 BIPOLAR AFFECTIVE DISORDER, REMISSION STATUS UNSPECIFIED (HCC): Primary | ICD-10-CM

## 2022-09-21 DIAGNOSIS — I10 HYPERTENSION, UNSPECIFIED TYPE: ICD-10-CM

## 2022-09-21 PROCEDURE — 99214 OFFICE O/P EST MOD 30 MIN: CPT

## 2022-09-21 NOTE — PROGRESS NOTES
[address] [address] PRACTICE [facility]    NAME: Tania Mark  AGE: 44 y o  SEX: male  : 1985     DATE: 2022     Assessment and Plan:     Problem List Items Addressed This Visit        Cardiovascular and Mediastinum    Common migraine     Occasional migraine with visual changes during headache  Triggered by heat/sun  Relieved by Tylenol    - Continue Tylenol PRN  Hypertension     BP at goal in office today: 138/86   - Currently diet controlled  - Continue low-salt diet and daily physical activity  Relevant Orders    CBC and differential    Comprehensive metabolic panel       Other    Bipolar affective psychosis ([facility] [address] ) - Primary     No current mental health treatment, states when he has talked about his symptoms in the past he has been labeled as "crazy" or "psycho " Admits to feelings of depression but denies ever having thoughts to hurt himself or others  Mentions being "spiritual" and being able to communicate with people who have   Does not appear to have active A/V hallucinations, no command hallucinations  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  States he sometimes considers treatment for the depression but declines referral at this time  - Discussed chronic nature of condition, warning signs    - Provided list of local mental health resources  Obesity     BMI Counseling: There is no height or weight on file to calculate BMI  The BMI is above normal  Nutrition recommendations include decreasing portion sizes, encouraging healthy choices of fruits and vegetables, limiting drinks that contain sugar and moderation in carbohydrate intake  Exercise recommendations include moderate physical activity 150 minutes/week  Rationale for BMI follow-up plan is due to patient being overweight or obese       - HgbA1c, lipid panel         Relevant Orders    Hemoglobin A1C    Lipid panel Tobacco dependence     Tobacco Cessation Counseling: Tobacco cessation counseling was provided  The patient is sincerely urged to quit consumption of tobacco  He is not ready to quit tobacco           's permit physical examination     Pt with no hx of neurological disorders, seizures, syncope or other loss of consciousness, DM, uncontrolled HTN, CV disorders, cognitive impairments, alcohol abuse, or drug abuse  In-office vision screening passed and no exam findings today that would preclude safe operation of a vehicle  Pt counseled regarding safe driving habits  Learner's Permit form signed  Screening for hepatitis C declined     Pt states he is not sexually active and has no risk factors  Declines screening  HIV screening declined     Pt states he is not sexually active and has no risk factors  Declines screening  BMI Counseling: There is no height or weight on file to calculate BMI  The BMI is above normal  Nutrition recommendations include decreasing portion sizes, encouraging healthy choices of fruits and vegetables, limiting drinks that contain sugar and moderation in carbohydrate intake  Exercise recommendations include moderate physical activity 150 minutes/week  Rationale for BMI follow-up plan is due to patient being overweight or obese  Tobacco Cessation Counseling: Tobacco cessation counseling was provided  The patient is sincerely urged to quit consumption of tobacco  He is not ready to quit tobacco          Return in about 6 months (around 3/21/2023) for Medicare Wellness  Chief Complaint:     Chief Complaint   Patient presents with    Physical Exam     DMV       History of Present Illness:     Concepcion Bae  presented to the office for completion of a 's License physical  Pt had a form completed by previous PCP about 1 year ago but never went to the [facility] due to a problem with his birth certificate from [place]          Review of Systems: Review of Systems   Constitutional: Negative for activity change, appetite change, fatigue, fever and unexpected weight change  Eyes: Negative for visual disturbance  Respiratory: Negative for cough, chest tightness, shortness of breath and wheezing  Cardiovascular: Negative for chest pain, palpitations and leg swelling  Gastrointestinal: Negative for abdominal pain, constipation, diarrhea, nausea and vomiting  Genitourinary: Negative for difficulty urinating  Neurological: Positive for headaches (migraines)  Negative for dizziness, seizures, syncope, weakness and numbness  Psychiatric/Behavioral: Positive for dysphoric mood  Negative for suicidal ideas  The patient is nervous/anxious  All other systems reviewed and are negative       Past Medical History:     Past Medical History:   Diagnosis Date    B[facility] affective psychosis ([facility] [address] )     Depression     Lumbar radiculopathy     Migraine     Obesity     Sleep apnea       Past Surgical History:     Past Surgical History:   Procedure Laterality Date    APPENDECTOMY        Social History:     Social History     Socioeconomic History    Marital status: Single     Spouse name: None    Number of children: None    Years of education: None    Highest education level: None   Occupational History    None   Tobacco Use    Smoking status: Light Tobacco Smoker     Types: Cigarettes    Smokeless tobacco: Never Used    Tobacco comment: 2 cigarettes per day   Vaping Use    Vaping Use: Never used   Substance and Sexual Activity    Alcohol use: Not Currently     Comment: rarely    Drug use: No    Sexual activity: None   Other Topics Concern    None   Social History Narrative    None     Social Determinants of Health     Financial Resource Strain: Low Risk     Difficulty of Paying Living Expenses: Not hard at all   Food Insecurity: No Food Insecurity    Worried About Running Out of Food in the Last Year: Never true    Lisset of Food in the Last Year: Never true   Transportation Needs: Unmet Transportation Needs    Lack of Transportation (Medical): Yes    Lack of Transportation (Non-Medical): Yes   Physical Activity: Not on file   Stress: Not on file   Social Connections: Not on file   Intimate Partner Violence: Not on file   Housing Stability: Not on file      Family History:     History reviewed  No pertinent family history  Current Medications:     No current outpatient medications on file  No current facility-administered medications for this visit  Allergies: Allergies   Allergen Reactions    American Cockroach Allergy Skin Test     Cat Hair Extract     Dog Epithelium     Dog Epithelium Allergy Skin Test Other (See Comments)    Olmesartan     Shellfish Allergy - Food Allergy Other (See Comments)    Shellfish-Derived Products - Food Allergy       Physical Exam:     /86 (BP Location: Right arm, Patient Position: Sitting, Cuff Size: Standard)   Pulse 95   Temp (!) 96 9 °F (36 1 °C) (Temporal)   Resp 19   Ht 5' 11" (1 803 m)   Wt 115 kg (254 lb)   SpO2 97%   BMI 35 43 kg/m²       Visual Acuity Screening    Right eye Left eye Both eyes   Without correction: 20/20 20/20 20/20   With correction:            Physical Exam  Vitals reviewed  Constitutional:       General: He is not in acute distress  Appearance: He is obese  He is not ill-appearing or diaphoretic  HENT:      Head: Normocephalic and atraumatic  Right Ear: External ear normal  There is impacted cerumen  Left Ear: External ear normal  There is impacted cerumen  Nose: Nose normal       Mouth/Throat:      Mouth: Mucous membranes are moist       Pharynx: Oropharynx is clear  Eyes:      General: Lids are normal       Extraocular Movements: Extraocular movements intact  Conjunctiva/sclera: Conjunctivae normal       Pupils: Pupils are equal, round, and reactive to light  Neck:      Thyroid: No thyromegaly or thyroid tenderness  Cardiovascular:      Rate and Rhythm: Normal rate and regular rhythm  Pulses: Normal pulses  Heart sounds: Normal heart sounds  No murmur heard  Pulmonary:      Effort: Pulmonary effort is normal  No tachypnea  Breath sounds: Normal breath sounds  No decreased breath sounds or wheezing  Abdominal:      General: Bowel sounds are normal  There is no distension  Palpations: Abdomen is soft  Tenderness: There is no abdominal tenderness  Musculoskeletal:      Right lower leg: No edema  Left lower leg: No edema  Lymphadenopathy:      Cervical: No cervical adenopathy  Skin:     General: Skin is warm and dry  Neurological:      Mental Status: He is alert and oriented to person, place, and time  Motor: Motor function is intact  Gait: Gait is intact  Psychiatric:         Attention and Perception: Attention normal          Mood and Affect: Mood and affect normal          Speech: Speech normal          Behavior: Behavior normal          Thought Content: Thought content is delusional  Thought content does not include homicidal or suicidal ideation            Eddie Bates 33

## 2022-09-22 PROBLEM — Z53.20 SCREENING FOR HEPATITIS C DECLINED: Status: ACTIVE | Noted: 2022-09-22

## 2022-09-22 PROBLEM — Z02.4 DRIVER'S PERMIT PHYSICAL EXAMINATION: Status: ACTIVE | Noted: 2022-09-22

## 2022-09-22 PROBLEM — Z53.20 HIV SCREENING DECLINED: Status: ACTIVE | Noted: 2022-09-22

## 2022-09-22 NOTE — ASSESSMENT & PLAN NOTE
Occasional migraine with visual changes during headache  Triggered by heat/sun  Relieved by Tylenol    - Continue Tylenol PRN

## 2022-09-22 NOTE — ASSESSMENT & PLAN NOTE
Tobacco Cessation Counseling: Tobacco cessation counseling was provided   The patient is sincerely urged to quit consumption of tobacco  He is not ready to quit tobacco

## 2022-09-22 NOTE — ASSESSMENT & PLAN NOTE
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

## 2022-09-22 NOTE — ASSESSMENT & PLAN NOTE
BP at goal in office today: 138/86   - Currently diet controlled  - Continue low-salt diet and daily physical activity

## 2022-09-22 NOTE — ASSESSMENT & PLAN NOTE
BMI Counseling: There is no height or weight on file to calculate BMI  The BMI is above normal  Nutrition recommendations include decreasing portion sizes, encouraging healthy choices of fruits and vegetables, limiting drinks that contain sugar and moderation in carbohydrate intake  Exercise recommendations include moderate physical activity 150 minutes/week  Rationale for BMI follow-up plan is due to patient being overweight or obese       - HgbA1c, lipid panel

## 2022-09-22 NOTE — ASSESSMENT & PLAN NOTE
Pt with no hx of neurological disorders, seizures, syncope or other loss of consciousness, DM, uncontrolled HTN, CV disorders, cognitive impairments, alcohol abuse, or drug abuse  In-office vision screening passed and no exam findings today that would preclude safe operation of a vehicle  Pt counseled regarding safe driving habits  Learner's Permit form signed

## 2022-11-07 ENCOUNTER — TELEPHONE (OUTPATIENT)
Dept: FAMILY MEDICINE CLINIC | Facility: CLINIC | Age: 37
End: 2022-11-07

## 2022-11-21 PROBLEM — Z02.4 DRIVER'S PERMIT PHYSICAL EXAMINATION: Status: RESOLVED | Noted: 2022-09-22 | Resolved: 2022-11-21

## 2022-12-13 ENCOUNTER — TELEPHONE (OUTPATIENT)
Dept: FAMILY MEDICINE CLINIC | Facility: CLINIC | Age: 37
End: 2022-12-13

## 2022-12-13 NOTE — TELEPHONE ENCOUNTER
Thanks  Generic COVID letter completed per protocol   If he does not meet the criteria listed on the letter, he must schedule a follow up for additional excuse

## 2022-12-15 ENCOUNTER — OFFICE VISIT (OUTPATIENT)
Dept: FAMILY MEDICINE CLINIC | Facility: CLINIC | Age: 37
End: 2022-12-15

## 2022-12-15 VITALS
HEART RATE: 88 BPM | RESPIRATION RATE: 18 BRPM | OXYGEN SATURATION: 98 % | SYSTOLIC BLOOD PRESSURE: 126 MMHG | WEIGHT: 260 LBS | BODY MASS INDEX: 36.4 KG/M2 | DIASTOLIC BLOOD PRESSURE: 80 MMHG | HEIGHT: 71 IN | TEMPERATURE: 97.4 F

## 2022-12-15 DIAGNOSIS — H61.23 BILATERAL IMPACTED CERUMEN: ICD-10-CM

## 2022-12-15 DIAGNOSIS — M25.531 RIGHT WRIST PAIN: ICD-10-CM

## 2022-12-15 DIAGNOSIS — K62.5 BRIGHT RED RECTAL BLEEDING: ICD-10-CM

## 2022-12-15 DIAGNOSIS — J45.990 EXERCISE-INDUCED ASTHMA: ICD-10-CM

## 2022-12-15 DIAGNOSIS — U07.1 COVID-19: Primary | ICD-10-CM

## 2022-12-15 RX ORDER — ALBUTEROL SULFATE 90 UG/1
2 AEROSOL, METERED RESPIRATORY (INHALATION) EVERY 6 HOURS PRN
Qty: 18 G | Refills: 0 | Status: SHIPPED | OUTPATIENT
Start: 2022-12-15

## 2022-12-15 NOTE — LETTER
December 15, 2022     Patient: Yessica Landa  YOB: 1985  Date of Visit: 12/15/2022      To Whom it May Concern:    Maura Rizzo is under my professional care  Carole Berg was seen in my office on 12/15/2022  Carole Berg may return to work on 12/19/2022  If you have any questions or concerns, please don't hesitate to call           Sincerely,            MARY Barth        CC: No Recipients

## 2022-12-15 NOTE — PROGRESS NOTES
Name: Sathya Salcedo  : 1985      MRN: [id number]  Encounter Provider: MARY Li  Encounter Date: 12/15/2022   Encounter department: [facility]    Assessment & Plan     1  COVID-19  Assessment & Plan:  Symptom onset 22, continues to have symptoms of chills, chest tightness, body aches, productive cough  - Symptomatic treatment  - Albuterol PRN  - Work note provided excusing pt from work tomorrow, to return on Monday  Orders:  -     albuterol (Ventolin HFA) 90 mcg/act inhaler; Inhale 2 puffs every 6 (six) hours as needed for wheezing or shortness of breath    2  Exercise-induced asthma  Assessment & Plan:  No SOB or wheezing at this time however COVID positive  - Refilled albuterol for PRN use  Orders:  -     albuterol (Ventolin HFA) 90 mcg/act inhaler; Inhale 2 puffs every 6 (six) hours as needed for wheezing or shortness of breath    3  Right wrist pain  Assessment & Plan:  No injury  Normal exam    - RICE plus diclofenac gel PRN  - Recheck in 4 weeks  Orders:  -     Diclofenac Sodium (VOLTAREN) 1 %; Apply 2 g topically 2 (two) times a day as needed (pain)    4  Bright red rectal bleeding  Assessment & Plan:  Painless BRRB noted with bowel movements  No abdominal pain or change in stools  Admits to intermittent constipation    - Increase fluids/fiber/fruits and vegetables in diet  - Trial hemorrhoid suppositories, follow up in 4 weeks  5  Bilateral impacted cerumen  Assessment & Plan:  - Peroxide solution BID x4 days prior to appt for flush  Subjective     HPI     Yariel presented to the office for c/o ongoing COVID symptoms  Onset 22, tested positive with home test  Symptoms include fever/chills, chest tightness, productive cough, body aches, headache, and swollen tonsils  Negative for SOB, wheezing, ear pain, N/V/D, or any other symptoms  Pt states he is hydrating and urinating normally  Last fever 12/10  Pt has been treating with soup, tea, and Tylenol  Pt also mentioned that he is having right wrist pain and rectal bleeding         Review of Systems   Constitutional: Positive for chills, fatigue and fever  Negative for unexpected weight change  HENT: Positive for sore throat  Negative for congestion, ear pain, rhinorrhea and trouble swallowing  Eyes: Negative for pain, redness and visual disturbance  Respiratory: Positive for cough and chest tightness  Negative for shortness of breath and wheezing  Cardiovascular: Negative for chest pain, palpitations and leg swelling  Gastrointestinal: Positive for anal bleeding and constipation  Negative for abdominal pain, blood in stool, diarrhea, nausea, rectal pain and vomiting  Genitourinary: Negative for decreased urine volume and difficulty urinating  Musculoskeletal: Positive for arthralgias (right wrist)  Neurological: Positive for headaches  Negative for dizziness  All other systems reviewed and are negative  Past Medical History:   Diagnosis Date   • Bipolar affective psychosis ([facility] [address] )    • Depression    • Lumbar radiculopathy    • Migraine    • Obesity    • Sleep apnea      Past Surgical History:   Procedure Laterality Date   • APPENDECTOMY       History reviewed  No pertinent family history    Social History     Socioeconomic History   • Marital status: Single     Spouse name: None   • Number of children: None   • Years of education: None   • Highest education level: None   Occupational History   • None   Tobacco Use   • Smoking status: Light Smoker     Types: Cigarettes   • Smokeless tobacco: Never   • Tobacco comments:     2 cigarettes per day   Vaping Use   • Vaping Use: Never used   Substance and Sexual Activity   • Alcohol use: Not Currently     Comment: rarely   • Drug use: No   • Sexual activity: None   Other Topics Concern   • None   Social History Narrative   • None     Social Determinants of Health     Financial Resource Strain: Low Risk    • Difficulty of Paying Living Expenses: Not hard at all   Food Insecurity: No Food Insecurity   • Worried About Running Out of Food in the Last Year: Never true   • Ran Out of Food in the Last Year: Never true   Transportation Needs: Unmet Transportation Needs   • Lack of Transportation (Medical): Yes   • Lack of Transportation (Non-Medical): Yes   Physical Activity: Not on file   Stress: Not on file   Social Connections: Not on file   Intimate Partner Violence: Not on file   Housing Stability: Not on file     No current outpatient medications on file prior to visit  Allergies   Allergen Reactions   • American Cockroach Allergy Skin Test    • Cat Hair Extract    • Dog Epithelium    • Dog Epithelium Allergy Skin Test Other (See Comments)   • Olmesartan    • Shellfish Allergy - Food Allergy Other (See Comments)   • Shellfish-Derived Products - Food Allergy      Immunization History   Administered Date(s) Administered   • Influenza Split 11/19/2012, 10/31/2013       Objective     /80 (BP Location: Left arm, Patient Position: Sitting, Cuff Size: Large)   Pulse 88   Temp (!) 97 4 °F (36 3 °C) (Temporal)   Resp 18   Ht 5' 11" (1 803 m)   Wt 118 kg (260 lb)   SpO2 98%   BMI 36 26 kg/m²     Physical Exam  Vitals reviewed  Constitutional:       General: He is not in acute distress  Appearance: He is obese  He is not ill-appearing or diaphoretic  HENT:      Head: Normocephalic and atraumatic  Right Ear: External ear normal  There is impacted cerumen  Left Ear: External ear normal  There is impacted cerumen  Nose: Nose normal       Mouth/Throat:      Mouth: Mucous membranes are moist       Pharynx: Oropharynx is clear  No oropharyngeal exudate or posterior oropharyngeal erythema  Tonsils: No tonsillar exudate  1+ on the right  1+ on the left     Eyes:      General: Lids are normal       Conjunctiva/sclera: Conjunctivae normal       Pupils: Pupils are equal, round, and reactive to light  Cardiovascular:      Rate and Rhythm: Normal rate and regular rhythm  Heart sounds: Normal heart sounds  No murmur heard  Pulmonary:      Effort: Pulmonary effort is normal  No tachypnea  Breath sounds: Normal breath sounds  No decreased breath sounds, wheezing, rhonchi or rales  Abdominal:      General: Bowel sounds are normal  There is no distension  Palpations: Abdomen is soft  Tenderness: There is no abdominal tenderness  There is no guarding or rebound  Musculoskeletal:      Right wrist: Tenderness present  No swelling, deformity or crepitus  Normal range of motion  Left wrist: Normal       Cervical back: Neck supple  Lymphadenopathy:      Cervical: No cervical adenopathy  Skin:     General: Skin is warm and dry  Neurological:      Mental Status: He is alert and oriented to person, place, and time     Psychiatric:         Mood and Affect: Mood and affect normal        MARY Bruner

## 2022-12-15 NOTE — PATIENT INSTRUCTIONS
Tylenol or Motrin for sore throat, body aches, or fever  1/4 tsp of salt in 8 oz warm water to gargle for sore throat  Use the peroxide solution morning and night to both ears for 4 days before appointment for ear flush

## 2022-12-22 PROBLEM — K62.5 BRIGHT RED RECTAL BLEEDING: Status: ACTIVE | Noted: 2022-12-22

## 2022-12-22 PROBLEM — H61.23 BILATERAL IMPACTED CERUMEN: Status: ACTIVE | Noted: 2022-12-22

## 2022-12-22 PROBLEM — U07.1 COVID-19: Status: ACTIVE | Noted: 2022-12-22

## 2022-12-22 PROBLEM — M25.531 RIGHT WRIST PAIN: Status: ACTIVE | Noted: 2022-12-22

## 2022-12-23 NOTE — ASSESSMENT & PLAN NOTE
Symptom onset 12/8/22, continues to have symptoms of chills, chest tightness, body aches, productive cough  - Symptomatic treatment  - Albuterol PRN  - Work note provided excusing pt from work tomorrow, to return on Monday

## 2022-12-23 NOTE — ASSESSMENT & PLAN NOTE
Painless BRRB noted with bowel movements  No abdominal pain or change in stools  Admits to intermittent constipation    - Increase fluids/fiber/fruits and vegetables in diet  - Trial hemorrhoid suppositories, follow up in 4 weeks

## 2023-01-09 ENCOUNTER — TELEPHONE (OUTPATIENT)
Dept: FAMILY MEDICINE CLINIC | Facility: CLINIC | Age: 38
End: 2023-01-09

## 2023-01-18 ENCOUNTER — PROCEDURE VISIT (OUTPATIENT)
Dept: FAMILY MEDICINE CLINIC | Facility: CLINIC | Age: 38
End: 2023-01-18

## 2023-01-18 VITALS
OXYGEN SATURATION: 98 % | HEIGHT: 71 IN | TEMPERATURE: 98.7 F | BODY MASS INDEX: 37.8 KG/M2 | RESPIRATION RATE: 16 BRPM | DIASTOLIC BLOOD PRESSURE: 80 MMHG | WEIGHT: 270 LBS | HEART RATE: 101 BPM | SYSTOLIC BLOOD PRESSURE: 122 MMHG

## 2023-01-18 DIAGNOSIS — K62.5 BRIGHT RED RECTAL BLEEDING: ICD-10-CM

## 2023-01-18 DIAGNOSIS — K64.9 HEMORRHOIDS, UNSPECIFIED HEMORRHOID TYPE: ICD-10-CM

## 2023-01-18 DIAGNOSIS — H61.23 BILATERAL IMPACTED CERUMEN: Primary | ICD-10-CM

## 2023-01-18 DIAGNOSIS — L21.0 DANDRUFF: ICD-10-CM

## 2023-01-18 RX ORDER — HYDROCORTISONE 25 MG/G
CREAM TOPICAL 2 TIMES DAILY
Qty: 28 G | Refills: 1 | Status: SHIPPED | OUTPATIENT
Start: 2023-01-18

## 2023-01-18 RX ORDER — SELENIUM SULFIDE 2.5 MG/100ML
LOTION TOPICAL 2 TIMES WEEKLY
Qty: 118 ML | Refills: 1 | Status: SHIPPED | OUTPATIENT
Start: 2023-01-19

## 2023-01-18 NOTE — PROGRESS NOTES
Name: Lacie Lee  : 1985      MRN: [id number]  Encounter Provider: MARY Choi  Encounter Date: 2023   Encounter department: [address] [place] [address]     1  Bilateral impacted cerumen  Assessment & Plan:  - Cerumen completely removed from right ear and partially removed from left via lavage   - Recommend 4 day treatment with Debrox on left  Orders:  -     carbamide peroxide (DEBROX) 6 5 % otic solution; Administer 5 drops into both ears 2 (two) times a day for 4 days (may repeat monthly)    2  Bright red rectal bleeding  Assessment & Plan:  Roshni Patel continues with BMs  No abdominal pain or change in stools  Reports had colonoscopy in past for same  Has not tried OTC hemorrhoid treatments  Pt again declined rectal exam   - Avoid constipation/straining   - Try hemorrhoid suppositories/cream   - Follow up for exam if no improvement  3  Hemorrhoids, unspecified hemorrhoid type  -     hydrocortisone (ANUSOL-HC) 2 5 % rectal cream; Apply topically 2 (two) times a day    4  Dandruff  Assessment & Plan:  - Selenium sulfide shampoo 2x weekly  Orders:  -     selenium sulfide (SELSUN) 2 5 % shampoo; Apply topically 2 (two) times a week         Subjective     HPI     Cesar Spine presented to the office for cerumen impaction/removal  Pt was not able to prepare for procedure due to cost of Debrox  Pt continues to report bright red rectal bleeding with BMs  Review of Systems   Constitutional: Negative for fatigue, fever and unexpected weight change  HENT: Positive for hearing loss  Negative for congestion, ear discharge, ear pain, sore throat and trouble swallowing  Eyes: Negative for visual disturbance  Respiratory: Negative for cough, chest tightness, shortness of breath and wheezing  Cardiovascular: Negative for chest pain and palpitations  Gastrointestinal: Positive for anal bleeding   Negative for abdominal pain, blood in stool, constipation, diarrhea, nausea and vomiting  Genitourinary: Negative for dysuria  Neurological: Negative for dizziness and headaches  All other systems reviewed and are negative  Past Medical History:   Diagnosis Date   • Bipolar affective psychosis ([facility] [address] )    • Depression    • Lumbar radiculopathy    • Migraine    • Obesity    • Sleep apnea      Past Surgical History:   Procedure Laterality Date   • APPENDECTOMY       History reviewed  No pertinent family history  Social History     Socioeconomic History   • Marital status: Single     Spouse name: None   • Number of children: None   • Years of education: None   • Highest education level: None   Occupational History   • None   Tobacco Use   • Smoking status: Light Smoker     Types: Cigarettes   • Smokeless tobacco: Never   • Tobacco comments:     2 cigarettes per day   Vaping Use   • Vaping Use: Never used   Substance and Sexual Activity   • Alcohol use: Not Currently     Comment: rarely   • Drug use: No   • Sexual activity: None   Other Topics Concern   • None   Social History Narrative   • None     Social Determinants of Health     Financial Resource Strain: Low Risk    • Difficulty of Paying Living Expenses: Not hard at all   Food Insecurity: No Food Insecurity   • Worried About Running Out of Food in the Last Year: Never true   • Ran Out of Food in the Last Year: Never true   Transportation Needs: Unmet Transportation Needs   • Lack of Transportation (Medical):  Yes   • Lack of Transportation (Non-Medical): Yes   Physical Activity: Not on file   Stress: Not on file   Social Connections: Not on file   Intimate Partner Violence: Not on file   Housing Stability: Not on file     Current Outpatient Medications on File Prior to Visit   Medication Sig   • albuterol (Ventolin HFA) 90 mcg/act inhaler Inhale 2 puffs every 6 (six) hours as needed for wheezing or shortness of breath   • Diclofenac Sodium (VOLTAREN) 1 % Apply 2 g topically 2 (two) times a day as needed (pain)     Allergies   Allergen Reactions   • American Cockroach Allergy Skin Test    • Cat Hair Extract    • Dog Epithelium    • Dog Epithelium Allergy Skin Test Other (See Comments)   • Olmesartan    • Shellfish Allergy - Food Allergy Other (See Comments)   • Shellfish-Derived Products - Food Allergy      Immunization History   Administered Date(s) Administered   • Influenza Split 11/19/2012, 10/31/2013       Objective     /80 (BP Location: Right arm, Patient Position: Sitting, Cuff Size: Large)   Pulse 101   Temp 98 7 °F (37 1 °C) (Temporal)   Resp 16   Ht 5' 11" (1 803 m)   Wt 122 kg (270 lb)   SpO2 98%   BMI 37 66 kg/m²     Physical Exam  Vitals reviewed  Constitutional:       General: He is not in acute distress  Appearance: He is obese  He is not ill-appearing or diaphoretic  HENT:      Head: Normocephalic and atraumatic  Right Ear: Tympanic membrane, ear canal and external ear normal  Tympanic membrane is not perforated, erythematous or bulging  Left Ear: External ear normal  There is impacted cerumen  Ears:      Comments: Normal TM visible on right after bilateral ear flush  Cerumen remains on left  Cardiovascular:      Rate and Rhythm: Normal rate and regular rhythm  Heart sounds: Normal heart sounds  No murmur heard  Pulmonary:      Effort: Pulmonary effort is normal  No tachypnea  Breath sounds: Normal breath sounds  No decreased breath sounds or wheezing  Skin:     General: Skin is warm and dry  Neurological:      Mental Status: He is alert and oriented to person, place, and time     Psychiatric:         Attention and Perception: Attention normal          Mood and Affect: Mood and affect normal          Speech: Speech normal          Behavior: Behavior normal          Ear cerumen removal    Date/Time: 1/18/2023 2:40 PM  Performed by: MARY Hendrix  Authorized by: MARY Hendrix   Universal Protocol:  Consent: Verbal consent obtained  Risks and benefits: risks, benefits and alternatives were discussed  Consent given by: patient  Patient understanding: patient states understanding of the procedure being performed      Patient location:  Clinic  Indications / Diagnosis:  Bilateral cerumen impaction  Procedure details:     Procedure type: irrigation only    Post-procedure details:     Complication:  None    Hearing quality:  Improved    Patient tolerance of procedure:   Tolerated well, no immediate complications        MARY Olivera

## 2023-01-18 NOTE — LETTER
January 18, 2023     Patient: Pavel Pérez  YOB: 1985  Date of Visit: 1/18/2023      To Whom it May Concern:    Scottheath Quinn is under my professional care  Cammie Santacruz was seen in my office on 1/18/2023  Cammie Santacruz may return to work on 1/18/2023  If you have any questions or concerns, please don't hesitate to call           Sincerely,              MARY Cueva        CC: No Recipients

## 2023-01-26 PROBLEM — L21.0 DANDRUFF: Status: ACTIVE | Noted: 2023-01-26

## 2023-01-26 NOTE — ASSESSMENT & PLAN NOTE
Roshni Blind continues with BMs  No abdominal pain or change in stools  Reports had colonoscopy in past for same  Has not tried OTC hemorrhoid treatments  Pt again declined rectal exam   - Avoid constipation/straining   - Try hemorrhoid suppositories/cream   - Follow up for exam if no improvement

## 2023-01-26 NOTE — ASSESSMENT & PLAN NOTE
- Cerumen completely removed from right ear and partially removed from left via lavage   - Recommend 4 day treatment with Debrox on left

## 2023-02-20 PROBLEM — H61.23 BILATERAL IMPACTED CERUMEN: Status: RESOLVED | Noted: 2022-12-22 | Resolved: 2023-02-20

## 2024-03-27 ENCOUNTER — OFFICE VISIT (OUTPATIENT)
Dept: DENTISTRY | Facility: CLINIC | Age: 39
End: 2024-03-27

## 2024-03-27 VITALS — TEMPERATURE: 97.8 F | DIASTOLIC BLOOD PRESSURE: 80 MMHG | HEART RATE: 80 BPM | SYSTOLIC BLOOD PRESSURE: 119 MMHG

## 2024-03-27 DIAGNOSIS — K02.9 CARIES: Primary | ICD-10-CM

## 2024-03-27 PROCEDURE — D0220 INTRAORAL - PERIAPICAL FIRST RADIOGRAPHIC IMAGE: HCPCS | Performed by: DENTIST

## 2024-03-27 PROCEDURE — D0230 INTRAORAL - PERIAPICAL EACH ADDITIONAL RADIOGRAPHIC IMAGE: HCPCS | Performed by: DENTIST

## 2024-03-27 PROCEDURE — D0270 BITEWING - SINGLE RADIOGRAPHIC IMAGE: HCPCS | Performed by: DENTIST

## 2024-03-27 PROCEDURE — D0140 LIMITED ORAL EVALUATION - PROBLEM FOCUSED: HCPCS | Performed by: DENTIST

## 2024-03-27 RX ORDER — AMOXICILLIN 500 MG/1
500 CAPSULE ORAL EVERY 8 HOURS SCHEDULED
Qty: 21 CAPSULE | Refills: 0 | Status: SHIPPED | OUTPATIENT
Start: 2024-03-27 | End: 2024-04-03

## 2024-03-27 NOTE — PROGRESS NOTES
Patient here for comp exam but would like us to address the pain ULQ and LLQ.  Las dental visit: few yrs ago  Referred from none  Chief complaint : pain > than 4 months ULQ LLQ.; teeth fractured for some time; now increasing pain LLQ.  ASA II  Pain Scale 3/ taking tylenol  Past medical history - HTN; Sleep Apnea  Medications see epic;  Allergies: environmental allergies and shellfish  Radiographs : L BW; PA #15 and 20/fractured Root tips.  # 18-MO caries; +PAP.  Extraoral exam nsg; no facial swelling noted.  Intraoral exam C/o TA ULQ #15 -fractured root tip                                               #20- fractured root.  Slight swelling noted ULQ/ hurts to chew.   #18- large MO caries near pupl- will need endo to restore; catching food. + PAP m root;   Gave pt options for #18 -would need Rct/Crown and possible crown lengthening; pt would like to know finances.?  Soft tissue exam : slight swelling noted around gum tissue #15.  Recommendation:1) referral for surgical extr #15 and #20.                                2) referral to endo #18                                3)Return for comp exam and tx plan.  NV- Cleaning and comp exam    Next visit  cleaning,Comp ex and tx plan

## 2024-07-09 ENCOUNTER — OFFICE VISIT (OUTPATIENT)
Dept: FAMILY MEDICINE CLINIC | Facility: CLINIC | Age: 39
End: 2024-07-09

## 2024-07-09 VITALS
OXYGEN SATURATION: 96 % | RESPIRATION RATE: 18 BRPM | WEIGHT: 257.8 LBS | BODY MASS INDEX: 38.18 KG/M2 | HEIGHT: 69 IN | TEMPERATURE: 98.4 F | DIASTOLIC BLOOD PRESSURE: 75 MMHG | SYSTOLIC BLOOD PRESSURE: 110 MMHG | HEART RATE: 88 BPM

## 2024-07-09 DIAGNOSIS — K59.01 SLOW TRANSIT CONSTIPATION: ICD-10-CM

## 2024-07-09 DIAGNOSIS — F32.A DEPRESSIVE DISORDER: ICD-10-CM

## 2024-07-09 DIAGNOSIS — F17.200 TOBACCO DEPENDENCE: ICD-10-CM

## 2024-07-09 DIAGNOSIS — F31.9 BIPOLAR AFFECTIVE DISORDER, REMISSION STATUS UNSPECIFIED (HCC): ICD-10-CM

## 2024-07-09 DIAGNOSIS — Z00.00 ENCOUNTER FOR ANNUAL WELLNESS VISIT (AWV) IN MEDICARE PATIENT: Primary | ICD-10-CM

## 2024-07-09 PROBLEM — U07.1 COVID-19: Status: RESOLVED | Noted: 2022-12-22 | Resolved: 2024-07-09

## 2024-07-09 PROBLEM — M25.531 RIGHT WRIST PAIN: Status: RESOLVED | Noted: 2022-12-22 | Resolved: 2024-07-09

## 2024-07-09 PROBLEM — E66.9 OBESITY: Status: RESOLVED | Noted: 2018-11-08 | Resolved: 2024-07-09

## 2024-07-09 PROBLEM — I10 HYPERTENSION: Status: RESOLVED | Noted: 2018-11-08 | Resolved: 2024-07-09

## 2024-07-09 PROBLEM — Z53.20 HIV SCREENING DECLINED: Status: RESOLVED | Noted: 2022-09-22 | Resolved: 2024-07-09

## 2024-07-09 PROBLEM — K62.5 BRIGHT RED RECTAL BLEEDING: Status: RESOLVED | Noted: 2022-12-22 | Resolved: 2024-07-09

## 2024-07-09 PROBLEM — S99.921A FOOT INJURY, RIGHT, INITIAL ENCOUNTER: Status: RESOLVED | Noted: 2021-09-27 | Resolved: 2024-07-09

## 2024-07-09 PROBLEM — Z53.20 SCREENING FOR HEPATITIS C DECLINED: Status: RESOLVED | Noted: 2022-09-22 | Resolved: 2024-07-09

## 2024-07-09 PROCEDURE — G0439 PPPS, SUBSEQ VISIT: HCPCS

## 2024-07-09 PROCEDURE — 99214 OFFICE O/P EST MOD 30 MIN: CPT

## 2024-07-09 RX ORDER — POLYETHYLENE GLYCOL 3350 17 G/17G
17 POWDER, FOR SOLUTION ORAL DAILY
Qty: 850 G | Refills: 0 | Status: SHIPPED | OUTPATIENT
Start: 2024-07-09

## 2024-07-09 NOTE — PROGRESS NOTES
Ambulatory Visit  Name: Nic Humphrey Jr.      : 1985      MRN: [id number]  Encounter Provider: MARY Long  Encounter Date: 2024   Encounter department: [facility]    Assessment & Plan   1. Encounter for annual wellness visit (AWV) in Medicare patient  2. BMI 38.0-38.9,adult  -     Basic metabolic panel; Future  -     Hemoglobin A1C; Future  -     Lipid Panel with Direct LDL reflex; Future  -     CBC and differential; Future  3. Bipolar affective disorder, remission status unspecified (HCC)  Assessment & Plan:  Currently not on any mental health treatment. Last seen years ago. He declines medication & psychotherapy at this time because he feels he is doing well.     4. Slow transit constipation  -     polyethylene glycol (GLYCOLAX) 17 GM/SCOOP powder; Take 17 g by mouth daily  5. Tobacco dependence  Assessment & Plan:  Currently smokes cigarettes about three times a week, about 3-4 cigarettes every time.  Tobacco Cessation Counseling: Tobacco cessation counseling and education was provided. The patient is sincerely urged to quit consumption of tobacco. He is not ready to quit tobacco. The numerous health risks of tobacco consumption were discussed. If he decides to quit, there are a number of helpful adjunctive aids, and he can see me to discuss nicotine replacement therapy, chantix, or bupropion anytime in the future.    6. Depressive disorder  Assessment & Plan:  PHQ-2/9 Depression Screening    Little interest or pleasure in doing things: 0 - not at all  Feeling down, depressed, or hopeless: 0 - not at all  Trouble falling or staying asleep, or sleeping too much: 0 - not at all  Feeling tired or having little energy: 2 - more than half the days  Poor appetite or overeatin - not at all  Feeling bad about yourself - or that you are a failure or have let yourself or your family down: 0 - not at all  Trouble concentrating on things, such as reading  the newspaper or watching television: 1 - several days  Moving or speaking so slowly that other people could have noticed. Or the opposite - being so fidgety or restless that you have been moving around a lot more than usual: 0 - not at all  Thoughts that you would be better off dead, or of hurting yourself in some way: 0 - not at all  PHQ-9 Score: 3  PHQ-9 Interpretation: No or Minimal depression      See bipolar affective psychosis     BMI Counseling: Body mass index is 38.07 kg/m². The BMI is above normal. Nutrition recommendations include decreasing portion sizes, encouraging healthy choices of fruits and vegetables, decreasing fast food intake, consuming healthier snacks, limiting drinks that contain sugar, moderation in carbohydrate intake, increasing intake of lean protein, reducing intake of saturated and trans fat and reducing intake of cholesterol. Exercise recommendations include moderate physical activity 150 minutes/week. No pharmacotherapy was ordered. Rationale for BMI follow-up plan is due to patient being overweight or obese.           Preventive health issues were discussed with patient, and age appropriate screening tests were ordered as noted in patient's After Visit Summary. Personalized health advice and appropriate referrals for health education or preventive services given if needed, as noted in patient's After Visit Summary.    History of Present Illness     Nic Humphrey . is a 39 y.o. male  has a past medical history of Bipolar affective psychosis (HCC), Depression, Lumbar radiculopathy, Migraine, Obesity, and Sleep apnea.  has a past surgical history that includes Appendectomy.    He is here for an AWV. Last OV was in January of 2023. He is here because he needed a height and weight for his job. He works as a  at [employer] and they are getting bullet proof vests.   He reports blood smear noted on toilet paper after having a bowel movement. Happening for several  years. Tx for hemorrhoids in the past but declined rectal exam at that time. Declines rectal exam today. Reports constipation. He has a bowel movement daily but strains and has difficulty every time. He eats a diet low in fiber, does not exercise, and does not drink 64 oz of water per day. Has not tried medication for constipation. Denies fevers, chills, weight loss, night sweats, unanticipated weight loss and appetite changes. No n/v/d/reflux.           Patient Care Team:  MARY Long as PCP - General (Family Medicine)    Review of Systems  As per [facility] in [place]    Medical History Reviewed by provider this encounter:  Tobacco  Allergies  Meds  Problems  Med Hx  Surg Hx  Fam Hx       Annual Wellness Visit Questionnaire       Health Risk Assessment:   Patient rates overall health as good. Patient feels that their physical health rating is slightly better. Patient is satisfied with their life. Eyesight was rated as same. Hearing was rated as same. Patient feels that their emotional and mental health rating is same. Patients states they are sometimes angry. Patient states they are often unusually tired/fatigued. Pain experienced in the last 7 days has been none. Patient states that he has experienced no weight loss or gain in last 6 months.     Depression Screening:   PHQ-9 Score: 3      Fall Risk Screening:   In the past year, patient has experienced: no history of falling in past year      Home Safety:  Patient does not have trouble with stairs inside or outside of their home. Patient has working smoke alarms and has working carbon monoxide detector. Home safety hazards include: none.     Nutrition:   Current diet is Regular.     Medications:   Patient is not currently taking any over-the-counter supplements. Patient is able to manage medications.     Activities of Daily Living (ADLs)/Instrumental Activities of Daily Living (IADLs):   Walk and transfer into and out of bed and chair?: Yes  Dress and groom  yourself?: Yes    Bathe or shower yourself?: Yes    Feed yourself? Yes  Do your laundry/housekeeping?: Yes  Manage your money, pay your bills and track your expenses?: Yes  Make your own meals?: Yes    Do your own shopping?: Yes    Previous Hospitalizations:   Any hospitalizations or ED visits within the last 12 months?: No      Advance Care Planning:   Living will: No    Durable POA for healthcare: No    Advanced directive: No      PREVENTIVE SCREENINGS        Prostate Cancer Screening:    General: Screening Not Indicated      Abdominal Aortic Aneurysm (AAA) Screening:    Risk factors include: tobacco use        Lung Cancer Screening:     General: Screening Not Indicated      Hepatitis C Screening:    General: Screening Current    Screening, Brief Intervention, and Referral to Treatment (SBIRT)    Screening      Single Item Drug Screening:  How often have you used an illegal drug (including marijuana) or a prescription medication for non-medical reasons in the past year? never    Single Item Drug Screen Score: 0  Interpretation: Negative screen for possible drug use disorder    Social Determinants of Health     Financial Resource Strain: Low Risk  (7/9/2024)    Overall Financial Resource Strain (CARDIA)     Difficulty of Paying Living Expenses: Not hard at all   Food Insecurity: No Food Insecurity (7/9/2024)    Hunger Vital Sign     Worried About Running Out of Food in the Last Year: Never true     Ran Out of Food in the Last Year: Never true   Transportation Needs: No Transportation Needs (7/9/2024)    PRAPARE - Transportation     Lack of Transportation (Medical): No     Lack of Transportation (Non-Medical): No   Housing Stability: Low Risk  (7/9/2024)    Housing Stability Vital Sign     Unable to Pay for Housing in the Last Year: No     Number of Times Moved in the Last Year: 1     Homeless in the Last Year: No   Utilities: Not At Risk (7/9/2024)    [facility] Utilities     Threatened with loss of utilities: No     No  "results found.    Objective     /75 (BP Location: Left arm, Patient Position: Sitting, Cuff Size: Standard)   Pulse 88   Temp 98.4 °F (36.9 °C) (Temporal)   Resp 18   Ht 5' 9\" (1.753 m)   Wt 117 kg (257 lb 12.8 oz)   SpO2 96%   BMI 38.07 kg/m²     Physical Exam  Vitals and nursing note reviewed.   Constitutional:       Appearance: He is well-developed. He is obese.   HENT:      Head: Normocephalic and atraumatic.      Right Ear: External ear normal.      Left Ear: External ear normal.      Nose: Nose normal.   Eyes:      Conjunctiva/sclera: Conjunctivae normal.   Cardiovascular:      Rate and Rhythm: Normal rate and regular rhythm.      Pulses: Normal pulses.      Heart sounds: Normal heart sounds. No murmur heard.  Pulmonary:      Effort: Pulmonary effort is normal. No respiratory distress.      Breath sounds: Normal breath sounds.   Abdominal:      General: Bowel sounds are normal.      Palpations: Abdomen is soft.      Tenderness: There is no abdominal tenderness.   Musculoskeletal:         General: Normal range of motion.      Cervical back: Normal range of motion and neck supple.   Skin:     General: Skin is warm and dry.      Capillary Refill: Capillary refill takes less than 2 seconds.   Neurological:      General: No focal deficit present.      Mental Status: He is alert and oriented to person, place, and time. Mental status is at baseline.   Psychiatric:         Mood and Affect: Mood normal.         Behavior: Behavior normal.         Thought Content: Thought content normal.         Judgment: Judgment normal.           "

## 2024-07-09 NOTE — PATIENT INSTRUCTIONS
Medicare Preventive Visit Patient Instructions  Thank you for completing your Welcome to Medicare Visit or Medicare Annual Wellness Visit today. Your next wellness visit will be due in one year (7/10/2025).  The screening/preventive services that you may require over the next 5-10 years are detailed below. Some tests may not apply to you based off risk factors and/or age. Screening tests ordered at today's visit but not completed yet may show as past due. Also, please note that scanned in results may not display below.  Preventive Screenings:  Service Recommendations Previous Testing/Comments   Colorectal Cancer Screening  Colonoscopy    Fecal Occult Blood Test (FOBT)/Fecal Immunochemical Test (FIT)  Fecal DNA/Cologuard Test  Flexible Sigmoidoscopy Age: 45-75 years old   Colonoscopy: every 10 years (May be performed more frequently if at higher risk)  OR  FOBT/FIT: every 1 year  OR  Cologuard: every 3 years  OR  Sigmoidoscopy: every 5 years  Screening may be recommended earlier than age 45 if at higher risk for colorectal cancer. Also, an individualized decision between you and your healthcare provider will decide whether screening between the ages of 76-85 would be appropriate. Colonoscopy: Not on file  FOBT/FIT: Not on file  Cologuard: Not on file  Sigmoidoscopy: Not on file          Prostate Cancer Screening Individualized decision between patient and health care provider in men between ages of 55-69   Medicare will cover every 12 months beginning on the day after your 50th birthday PSA: No results in last 5 years           Hepatitis C Screening Once for adults born between 1945 and 1965  More frequently in patients at high risk for Hepatitis C Hep C Antibody: Not on file        Diabetes Screening 1-2 times per year if you're at risk for diabetes or have pre-diabetes Fasting glucose: No results in last 5 years (No results in last 5 years)  A1C: No results in last 5 years (No results in last 5 years)       Cholesterol Screening Once every 5 years if you don't have a lipid disorder. May order more often based on risk factors. Lipid panel: Not on file         Other Preventive Screenings Covered by Medicare:  Abdominal Aortic Aneurysm (AAA) Screening: covered once if your at risk. You're considered to be at risk if you have a family history of AAA or a male between the age of 65-75 who smoking at least 100 cigarettes in your lifetime.  Lung Cancer Screening: covers low dose CT scan once per year if you meet all of the following conditions: (1) Age 55-77; (2) No signs or symptoms of lung cancer; (3) Current smoker or have quit smoking within the last 15 years; (4) You have a tobacco smoking history of at least 20 pack years (packs per day x number of years you smoked); (5) You get a written order from a healthcare provider.  Glaucoma Screening: covered annually if you're considered high risk: (1) You have diabetes OR (2) Family history of glaucoma OR (3)  aged 50 and older OR (4)  American aged 65 and older  Osteoporosis Screening: covered every 2 years if you meet one of the following conditions: (1) Have a vertebral abnormality; (2) On glucocorticoid therapy for more than 3 months; (3) Have primary hyperparathyroidism; (4) On osteoporosis medications and need to assess response to drug therapy.  HIV Screening: covered annually if you're between the age of 15-65. Also covered annually if you are younger than 15 and older than 65 with risk factors for HIV infection. For pregnant patients, it is covered up to 3 times per pregnancy.    Immunizations:  Immunization Recommendations   Influenza Vaccine Annual influenza vaccination during flu season is recommended for all persons aged >= 6 months who do not have contraindications   Pneumococcal Vaccine   * Pneumococcal conjugate vaccine = PCV13 (Prevnar 13), PCV15 (Vaxneuvance), PCV20 (Prevnar 20)  * Pneumococcal polysaccharide vaccine = PPSV23  (Pneumovax) Adults 19-65 yo with certain risk factors or if 65+ yo  If never received any pneumonia vaccine: recommend Prevnar 20 (PCV20)  Give PCV20 if previously received 1 dose of PCV13 or PPSV23   Hepatitis B Vaccine 3 dose series if at intermediate or high risk (ex: diabetes, end stage renal disease, liver disease)   Respiratory syncytial virus (RSV) Vaccine - COVERED BY MEDICARE PART D  * RSVPreF3 (Arexvy) CDC recommends that adults 60 years of age and older may receive a single dose of RSV vaccine using shared clinical decision-making (SCDM)   Tetanus (Td) Vaccine - COST NOT COVERED BY MEDICARE PART B Following completion of primary series, a booster dose should be given every 10 years to maintain immunity against tetanus. Td may also be given as tetanus wound prophylaxis.   Tdap Vaccine - COST NOT COVERED BY MEDICARE PART B Recommended at least once for all adults. For pregnant patients, recommended with each pregnancy.   Shingles Vaccine (Shingrix) - COST NOT COVERED BY MEDICARE PART B  2 shot series recommended in those 19 years and older who have or will have weakened immune systems or those 50 years and older     Health Maintenance Due:      Topic Date Due   • Hepatitis C Screening  Never done   • HIV Screening  Never done     Immunizations Due:      Topic Date Due   • Pneumococcal Vaccine: Pediatrics (0 to 5 Years) and At-Risk Patients (6 to 64 Years) (1 of 2 - PCV) Never done   • Hepatitis A Vaccine (1 of 2 - Risk 2-dose series) Never done   • COVID-19 Vaccine (1 - 2023-24 season) Never done   • Influenza Vaccine (1) 09/01/2024     Advance Directives   What are advance directives?  Advance directives are legal documents that state your wishes and plans for medical care. These plans are made ahead of time in case you lose your ability to make decisions for yourself. Advance directives can apply to any medical decision, such as the treatments you want, and if you want to donate organs.   What are the  types of advance directives?  There are many types of advance directives, and each state has rules about how to use them. You may choose a combination of any of the following:  Living will:  This is a written record of the treatment you want. You can also choose which treatments you do not want, which to limit, and which to stop at a certain time. This includes surgery, medicine, IV fluid, and tube feedings.   Durable power of  for healthcare (DPAHC):  This is a written record that states who you want to make healthcare choices for you when you are unable to make them for yourself. This person, called a proxy, is usually a family member or a friend. You may choose more than 1 proxy.  Do not resuscitate (DNR) order:  A DNR order is used in case your heart stops beating or you stop breathing. It is a request not to have certain forms of treatment, such as CPR. A DNR order may be included in other types of advance directives.  Medical directive:  This covers the care that you want if you are in a coma, near death, or unable to make decisions for yourself. You can list the treatments you want for each condition. Treatment may include pain medicine, surgery, blood transfusions, dialysis, IV or tube feedings, and a ventilator (breathing machine).  Values history:  This document has questions about your views, beliefs, and how you feel and think about life. This information can help others choose the care that you would choose.  Why are advance directives important?  An advance directive helps you control your care. Although spoken wishes may be used, it is better to have your wishes written down. Spoken wishes can be misunderstood, or not followed. Treatments may be given even if you do not want them. An advance directive may make it easier for your family to make difficult choices about your care.   Cigarette Smoking and Your Health   Risks to your health if you smoke:  Nicotine and other chemicals found in  tobacco damage every cell in your body. Even if you are a light smoker, you have an increased risk for cancer, heart disease, and lung disease. If you are pregnant or have diabetes, smoking increases your risk for complications.   Benefits to your health if you stop smoking:   You decrease respiratory symptoms such as coughing, wheezing, and shortness of breath.   You reduce your risk for cancers of the lung, mouth, throat, kidney, bladder, pancreas, stomach, and cervix. If you already have cancer, you increase the benefits of chemotherapy. You also reduce your risk for cancer returning or a second cancer from developing.   You reduce your risk for heart disease, blood clots, heart attack, and stroke.   You reduce your risk for lung infections, and diseases such as pneumonia, asthma, chronic bronchitis, and emphysema.  Your circulation improves. More oxygen can be delivered to your body. If you have diabetes, you lower your risk for complications, such as kidney, artery, and eye diseases. You also lower your risk for nerve damage. Nerve damage can lead to amputations, poor vision, and blindness.  You improve your body's ability to heal and to fight infections.  For more information and support to stop smoking:   [employer]  Phone: 2- 662 - 159-6832  Web Address: www.[employer]  Weight Management   Why it is important to manage your weight:  Being overweight increases your risk of health conditions such as heart disease, high blood pressure, type 2 diabetes, and certain types of cancer. It can also increase your risk for osteoarthritis, sleep apnea, and other respiratory problems. Aim for a slow, steady weight loss. Even a small amount of weight loss can lower your risk of health problems.  How to lose weight safely:  A safe and healthy way to lose weight is to eat fewer calories and get regular exercise. You can lose up about 1 pound a week by decreasing the number of calories you eat by 500 calories each day.    Healthy meal plan for weight management:  A healthy meal plan includes a variety of foods, contains fewer calories, and helps you stay healthy. A healthy meal plan includes the following:  Eat whole-grain foods more often.  A healthy meal plan should contain fiber. Fiber is the part of grains, fruits, and vegetables that is not broken down by your body. Whole-grain foods are healthy and provide extra fiber in your diet. Some examples of whole-grain foods are whole-wheat breads and pastas, oatmeal, brown rice, and bulgur.  Eat a variety of vegetables every day.  Include dark, leafy greens such as spinach, kale, you greens, and mustard greens. Eat yellow and orange vegetables such as carrots, sweet potatoes, and winter squash.   Eat a variety of fruits every day.  Choose fresh or canned fruit (canned in its own juice or light syrup) instead of juice. Fruit juice has very little or no fiber.  Eat low-fat dairy foods.  Drink fat-free (skim) milk or 1% milk. Eat fat-free yogurt and low-fat cottage cheese. Try low-fat cheeses such as mozzarella and other reduced-fat cheeses.  Choose meat and other protein foods that are low in fat.  Choose beans or other legumes such as split peas or lentils. Choose fish, skinless poultry (chicken or turkey), or lean cuts of red meat (beef or pork). Before you cook meat or poultry, cut off any visible fat.   Use less fat and oil.  Try baking foods instead of frying them. Add less fat, such as margarine, sour cream, regular salad dressing and mayonnaise to foods. Eat fewer high-fat foods. Some examples of high-fat foods include french fries, doughnuts, ice cream, and cakes.  Eat fewer sweets.  Limit foods and drinks that are high in sugar. This includes candy, cookies, regular soda, and sweetened drinks.  Exercise:  Exercise at least 30 minutes per day on most days of the week. Some examples of exercise include walking, biking, dancing, and swimming. You can also fit in more physical  activity by taking the stairs instead of the elevator or parking farther away from stores. Ask your healthcare provider about the best exercise plan for you.      © Copyright [employer] 2018 Information is for End User's use only and may not be sold, redistributed or otherwise used for commercial purposes. All illustrations and images included in CareNotes® are the copyrighted property of [employer] or [employer]

## 2024-07-09 NOTE — ASSESSMENT & PLAN NOTE
PHQ-2/9 Depression Screening    Little interest or pleasure in doing things: 0 - not at all  Feeling down, depressed, or hopeless: 0 - not at all  Trouble falling or staying asleep, or sleeping too much: 0 - not at all  Feeling tired or having little energy: 2 - more than half the days  Poor appetite or overeatin - not at all  Feeling bad about yourself - or that you are a failure or have let yourself or your family down: 0 - not at all  Trouble concentrating on things, such as reading the newspaper or watching television: 1 - several days  Moving or speaking so slowly that other people could have noticed. Or the opposite - being so fidgety or restless that you have been moving around a lot more than usual: 0 - not at all  Thoughts that you would be better off dead, or of hurting yourself in some way: 0 - not at all  PHQ-9 Score: 3  PHQ-9 Interpretation: No or Minimal depression      See bipolar affective psychosis

## 2024-07-09 NOTE — ASSESSMENT & PLAN NOTE
Currently not on any mental health treatment. Last seen years ago. He declines medication & psychotherapy at this time because he feels he is doing well.

## 2024-07-09 NOTE — ASSESSMENT & PLAN NOTE
Currently smokes cigarettes about three times a week, about 3-4 cigarettes every time.  Tobacco Cessation Counseling: Tobacco cessation counseling and education was provided. The patient is sincerely urged to quit consumption of tobacco. He is not ready to quit tobacco. The numerous health risks of tobacco consumption were discussed. If he decides to quit, there are a number of helpful adjunctive aids, and he can see me to discuss nicotine replacement therapy, chantix, or bupropion anytime in the future.

## 2024-07-30 NOTE — PROGRESS NOTES
"Ambulatory Visit  Name: Nic Humphrey Jr.      : 1985      MRN: [id number]  Encounter Provider: [facility]  Encounter Date: 2024   Encounter department: [facility]    Assessment & Plan   1. Cervical radiculopathy  -     methocarbamol (ROBAXIN) 500 mg tablet; Take 1 tablet (500 mg total) by mouth 4 (four) times a day as needed for muscle spasms  -     Ambulatory Referral to Physical Therapy; Future  2. Migraine without aura and without status migrainosus, not intractable  -     SUMAtriptan (Imitrex) 25 mg tablet; Take 1 tablet (25 mg total) by mouth once as needed for migraine for up to 30 doses  -     naproxen (Naprosyn) 500 mg tablet; Take 1 tablet (500 mg total) by mouth 2 (two) times a day as needed for headaches       History of Present Illness     Nic Humphrey Jr. is a 39 y.o. male  has a past medical history of Bipolar affective psychosis (HCC), Depression, Lumbar radiculopathy, Migraine, Obesity, and Sleep apnea.  has a past surgical history that includes Appendectomy.    He presents today for a same-day visit for a migraine episode that occurred yesterday. Pain was severe to the point that he could not tolerate the sunlight. No nausea, vomiting. He took a tylenol for the migraine which helped. Last migraine episode was months ago.  Pain is resolved right now.    He also reports  left cervical pain with tingling and numbness that radiates towards left arm. He has tried massage, tylenol, nsaids without relief. Pain is mild-moderate. No known recent injury. Aggravating factors: none. Alleviating factors: none. 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      Review of Systems  As per HPI      Objective     /91 (BP Location: Right arm, Patient Position: Sitting, Cuff Size: Large)   Pulse 87   Temp 97.6 °F (36.4 °C) (Temporal)   Resp 19   Ht 5' 9\" (1.753 m)   Wt 117 kg (257 lb)   SpO2 97%   BMI 37.95 kg/m² "     Physical Exam  Vitals and nursing note reviewed.   Constitutional:       General: He is not in acute distress.     Appearance: He is well-developed and overweight.   HENT:      Head: Normocephalic and atraumatic.      Right Ear: External ear normal.      Left Ear: External ear normal.      Nose: Nose normal.   Eyes:      Conjunctiva/sclera: Conjunctivae normal.   Cardiovascular:      Rate and Rhythm: Normal rate and regular rhythm.      Pulses: Normal pulses.      Heart sounds: Normal heart sounds. No murmur heard.  Pulmonary:      Effort: Pulmonary effort is normal. No respiratory distress.      Breath sounds: Normal breath sounds.   Abdominal:      Palpations: Abdomen is soft.      Tenderness: There is no abdominal tenderness.   Musculoskeletal:         General: No swelling. Normal range of motion.      Right shoulder: Normal.      Left shoulder: Normal.      Cervical back: Normal range of motion and neck supple. Pain with movement and muscular tenderness present.   Skin:     General: Skin is warm and dry.      Capillary Refill: Capillary refill takes less than 2 seconds.   Neurological:      General: No focal deficit present.      Mental Status: He is alert and oriented to person, place, and time. Mental status is at baseline.   Psychiatric:         Mood and Affect: Mood normal.         Behavior: Behavior normal.         Thought Content: Thought content normal.         Judgment: Judgment normal.     Administrative Statements

## 2024-07-31 ENCOUNTER — OFFICE VISIT (OUTPATIENT)
Dept: FAMILY MEDICINE CLINIC | Facility: CLINIC | Age: 39
End: 2024-07-31

## 2024-07-31 ENCOUNTER — APPOINTMENT (OUTPATIENT)
Dept: LAB | Facility: CLINIC | Age: 39
End: 2024-07-31
Payer: MEDICARE

## 2024-07-31 VITALS
OXYGEN SATURATION: 97 % | TEMPERATURE: 97.6 F | WEIGHT: 257 LBS | BODY MASS INDEX: 38.06 KG/M2 | DIASTOLIC BLOOD PRESSURE: 91 MMHG | HEART RATE: 87 BPM | HEIGHT: 69 IN | SYSTOLIC BLOOD PRESSURE: 127 MMHG | RESPIRATION RATE: 19 BRPM

## 2024-07-31 DIAGNOSIS — G43.009 MIGRAINE WITHOUT AURA AND WITHOUT STATUS MIGRAINOSUS, NOT INTRACTABLE: ICD-10-CM

## 2024-07-31 DIAGNOSIS — M54.12 CERVICAL RADICULOPATHY: Primary | ICD-10-CM

## 2024-07-31 LAB
ANION GAP SERPL CALCULATED.3IONS-SCNC: 8 MMOL/L (ref 4–13)
BASOPHILS # BLD AUTO: 0.04 THOUSANDS/ÂΜL (ref 0–0.1)
BASOPHILS NFR BLD AUTO: 1 % (ref 0–1)
BUN SERPL-MCNC: 9 MG/DL (ref 5–25)
CALCIUM SERPL-MCNC: 9.1 MG/DL (ref 8.4–10.2)
CHLORIDE SERPL-SCNC: 108 MMOL/L (ref 96–108)
CHOLEST SERPL-MCNC: 150 MG/DL
CO2 SERPL-SCNC: 23 MMOL/L (ref 21–32)
CREAT SERPL-MCNC: 0.88 MG/DL (ref 0.6–1.3)
EOSINOPHIL # BLD AUTO: 0.07 THOUSAND/ÂΜL (ref 0–0.61)
EOSINOPHIL NFR BLD AUTO: 1 % (ref 0–6)
ERYTHROCYTE [DISTWIDTH] IN BLOOD BY AUTOMATED COUNT: 14.2 % (ref 11.6–15.1)
EST. AVERAGE GLUCOSE BLD GHB EST-MCNC: 120 MG/DL
GFR SERPL CREATININE-BSD FRML MDRD: 107 ML/MIN/1.73SQ M
GLUCOSE P FAST SERPL-MCNC: 94 MG/DL (ref 65–99)
HBA1C MFR BLD: 5.8 %
HCT VFR BLD AUTO: 45.5 % (ref 36.5–49.3)
HDLC SERPL-MCNC: 42 MG/DL
HGB BLD-MCNC: 15 G/DL (ref 12–17)
IMM GRANULOCYTES # BLD AUTO: 0.02 THOUSAND/UL (ref 0–0.2)
IMM GRANULOCYTES NFR BLD AUTO: 0 % (ref 0–2)
LDLC SERPL CALC-MCNC: 94 MG/DL (ref 0–100)
LYMPHOCYTES # BLD AUTO: 1.17 THOUSANDS/ÂΜL (ref 0.6–4.47)
LYMPHOCYTES NFR BLD AUTO: 22 % (ref 14–44)
MCH RBC QN AUTO: 28.4 PG (ref 26.8–34.3)
MCHC RBC AUTO-ENTMCNC: 33 G/DL (ref 31.4–37.4)
MCV RBC AUTO: 86 FL (ref 82–98)
MONOCYTES # BLD AUTO: 0.34 THOUSAND/ÂΜL (ref 0.17–1.22)
MONOCYTES NFR BLD AUTO: 6 % (ref 4–12)
NEUTROPHILS # BLD AUTO: 3.65 THOUSANDS/ÂΜL (ref 1.85–7.62)
NEUTS SEG NFR BLD AUTO: 70 % (ref 43–75)
NRBC BLD AUTO-RTO: 0 /100 WBCS
PLATELET # BLD AUTO: 287 THOUSANDS/UL (ref 149–390)
PMV BLD AUTO: 9.7 FL (ref 8.9–12.7)
POTASSIUM SERPL-SCNC: 4 MMOL/L (ref 3.5–5.3)
RBC # BLD AUTO: 5.28 MILLION/UL (ref 3.88–5.62)
SODIUM SERPL-SCNC: 139 MMOL/L (ref 135–147)
TRIGL SERPL-MCNC: 71 MG/DL
WBC # BLD AUTO: 5.29 THOUSAND/UL (ref 4.31–10.16)

## 2024-07-31 PROCEDURE — G2211 COMPLEX E/M VISIT ADD ON: HCPCS

## 2024-07-31 PROCEDURE — 85025 COMPLETE CBC W/AUTO DIFF WBC: CPT

## 2024-07-31 PROCEDURE — 99214 OFFICE O/P EST MOD 30 MIN: CPT

## 2024-07-31 PROCEDURE — 80048 BASIC METABOLIC PNL TOTAL CA: CPT

## 2024-07-31 PROCEDURE — 80061 LIPID PANEL: CPT

## 2024-07-31 PROCEDURE — 36415 COLL VENOUS BLD VENIPUNCTURE: CPT

## 2024-07-31 PROCEDURE — 83036 HEMOGLOBIN GLYCOSYLATED A1C: CPT

## 2024-07-31 RX ORDER — NAPROXEN 500 MG/1
500 TABLET ORAL 2 TIMES DAILY PRN
Qty: 30 TABLET | Refills: 0 | Status: SHIPPED | OUTPATIENT
Start: 2024-07-31

## 2024-07-31 RX ORDER — SUMATRIPTAN 25 MG/1
25 TABLET, FILM COATED ORAL ONCE AS NEEDED
Qty: 30 TABLET | Refills: 0 | Status: SHIPPED | OUTPATIENT
Start: 2024-07-31

## 2024-07-31 RX ORDER — METHOCARBAMOL 500 MG/1
500 TABLET, FILM COATED ORAL 4 TIMES DAILY PRN
Qty: 30 TABLET | Refills: 0 | Status: SHIPPED | OUTPATIENT
Start: 2024-07-31

## 2024-07-31 NOTE — LETTER
July 31, 2024     Patient: Nic Humphrey Jr.  YOB: 1985  Date of Visit: 7/31/2024      To Whom it May Concern:    Nic Humphrey is under my professional care. Nic was seen in my office on 7/31/2024. Please excuse his absence from work on 7/30/24. Nic may return to work on 7/31/21 .    If you have any questions or concerns, please don't hesitate to call.         Sincerely,          [facility]        CC: No Recipients

## 2024-08-07 ENCOUNTER — OFFICE VISIT (OUTPATIENT)
Dept: FAMILY MEDICINE CLINIC | Facility: CLINIC | Age: 39
End: 2024-08-07

## 2024-08-07 VITALS
BODY MASS INDEX: 36.98 KG/M2 | SYSTOLIC BLOOD PRESSURE: 121 MMHG | WEIGHT: 249.7 LBS | RESPIRATION RATE: 18 BRPM | OXYGEN SATURATION: 96 % | TEMPERATURE: 97.8 F | HEART RATE: 90 BPM | HEIGHT: 69 IN | DIASTOLIC BLOOD PRESSURE: 86 MMHG

## 2024-08-07 DIAGNOSIS — T78.40XA ALLERGY, INITIAL ENCOUNTER: ICD-10-CM

## 2024-08-07 DIAGNOSIS — M54.12 CERVICAL RADICULOPATHY: Primary | ICD-10-CM

## 2024-08-07 RX ORDER — FLUTICASONE PROPIONATE 50 MCG
1 SPRAY, SUSPENSION (ML) NASAL DAILY
Qty: 18 ML | Refills: 3 | Status: SHIPPED | OUTPATIENT
Start: 2024-08-07

## 2024-08-07 NOTE — PROGRESS NOTES
Ambulatory Visit  Name: Nic Humphrey Jr.      : 1985      MRN: [id number]  Encounter Provider: Elias Schoen, MD  Encounter Date: 2024   Encounter department: [facility]    Assessment & Plan   1. Cervical radiculopathy  Assessment & Plan:  Symptoms consistent with diagnosis versus peripheral nerve compression in hand and separate issue of shoulder pain.  Symptoms are minor, no abnormal findings on exam.  - Recommended over-the-counter naproxen twice daily likely the best option to keep cost low  - Follow-up if symptoms not resolved in 2 weeks  2. Allergy, initial encounter  Assessment & Plan:  Patient with a chronic sore throat going on for the last couple weeks, denies any other symptoms  - Trialing Flonase for allergies suspected  Orders:  -     fluticasone (FLONASE) 50 mcg/act nasal spray; 1 spray into each nostril daily         History of Present Illness     Patient is a 39-year-old male here for follow-up regarding his cervical radiculopathy    Patient reports he did not take any of the medication he was prescribed as he did not have the co-pay for it.  He states his symptoms are about the same, he is some numbness and tingling that goes down his arm especially when he wakes up in the morning.  Pain in the shoulder on the left side.  He denies any weakness left hand.    Patient also reporting sore throat that has been going on for couple days.      Review of Systems   Constitutional:  Negative for fatigue and fever.   HENT:  Positive for sore throat.    Respiratory:  Negative for shortness of breath.    Gastrointestinal:  Negative for abdominal pain, constipation and diarrhea.   Genitourinary:  Negative for dysuria.   Musculoskeletal:  Positive for arthralgias.   Skin:  Negative for rash.   Neurological:  Negative for dizziness.     Past Medical History:   Diagnosis Date    Bipolar affective psychosis (HCC)     Depression     Lumbar radiculopathy     Migraine      "Obesity     Sleep apnea      Past Surgical History:   Procedure Laterality Date    APPENDECTOMY       History reviewed. No pertinent family history.  Social History     Tobacco Use    Smoking status: Light Smoker     Types: Cigarettes    Smokeless tobacco: Never    Tobacco comments:     2 cigarettes per day   Vaping Use    Vaping status: Never Used   Substance and Sexual Activity    Alcohol use: Not Currently     Comment: rarely    Drug use: No    Sexual activity: Not on file     Current Outpatient Medications on File Prior to Visit   Medication Sig    methocarbamol (ROBAXIN) 500 mg tablet Take 1 tablet (500 mg total) by mouth 4 (four) times a day as needed for muscle spasms    naproxen (Naprosyn) 500 mg tablet Take 1 tablet (500 mg total) by mouth 2 (two) times a day as needed for headaches    polyethylene glycol (GLYCOLAX) 17 GM/SCOOP powder Take 17 g by mouth daily    SUMAtriptan (Imitrex) 25 mg tablet Take 1 tablet (25 mg total) by mouth once as needed for migraine for up to 30 doses     Allergies   Allergen Reactions    American Cockroach     Cat Hair Extract     Dog Epithelium     Dog Epithelium (Canis Lupus Familiaris) Other (See Comments)    Olmesartan     Shellfish Allergy - Food Allergy Other (See Comments)    Shellfish-Derived Products - Food Allergy      Immunization History   Administered Date(s) Administered    Influenza Split 11/19/2012, 10/31/2013     Objective     /86 (BP Location: Left arm, Patient Position: Sitting, Cuff Size: Large)   Pulse 90   Temp 97.8 °F (36.6 °C) (Temporal)   Resp 18   Ht 5' 9\" (1.753 m)   Wt 113 kg (249 lb 11.2 oz)   SpO2 96%   BMI 36.87 kg/m²     Physical Exam  Constitutional:       Appearance: Normal appearance.   HENT:      Mouth/Throat:      Mouth: Mucous membranes are moist.   Eyes:      Pupils: Pupils are equal, round, and reactive to light.   Cardiovascular:      Rate and Rhythm: Normal rate and regular rhythm.   Pulmonary:      Effort: Pulmonary effort " is normal.   Musculoskeletal:         General: Normal range of motion.      Left shoulder: No tenderness or bony tenderness. Normal range of motion. Normal strength.      Right hand: Normal.      Left hand: Normal strength.      Cervical back: Normal range of motion.      Comments: Tinel's negative   Skin:     General: Skin is warm.   Neurological:      General: No focal deficit present.      Mental Status: He is alert.   Psychiatric:         Mood and Affect: Mood normal.         Behavior: Behavior normal.

## 2024-08-08 NOTE — ASSESSMENT & PLAN NOTE
Patient with a chronic sore throat going on for the last couple weeks, denies any other symptoms  - Trialing Flonase for allergies suspected

## 2024-08-08 NOTE — ASSESSMENT & PLAN NOTE
Symptoms consistent with diagnosis versus peripheral nerve compression in hand and separate issue of shoulder pain.  Symptoms are minor, no abnormal findings on exam.  - Recommended over-the-counter naproxen twice daily likely the best option to keep cost low  - Follow-up if symptoms not resolved in 2 weeks

## 2024-12-10 ENCOUNTER — TELEPHONE (OUTPATIENT)
Dept: FAMILY MEDICINE CLINIC | Facility: CLINIC | Age: 39
End: 2024-12-10

## 2024-12-10 NOTE — PROGRESS NOTES
"Name: Nic Humphrey Jr.      : 1985      MRN: [id number]  Encounter Provider: MARY Long  Encounter Date: 2024   Encounter department: [facility]  :  Assessment & Plan  Viral upper respiratory illness  Negative covid & flu. Will send out throat culture. Start flonase.   Orders:    fluticasone (FLONASE) 50 mcg/act nasal spray; 1 spray into each nostril daily    Throat culture; Future    POCT Rapid Covid Ag    POCT rapid flu A and B           History of Present Illness     Nic Humphrey Jr. is a 39 y.o. male  has a past medical history of Bipolar affective psychosis (HCC), Depression, Lumbar radiculopathy, Migraine, Obesity, and Sleep apnea.  has a past surgical history that includes Appendectomy.    Here today for an allergies f/u. He reports chronic intermittent congestion, rhinorrhea, and sneezing. He started to have a sore throat today. He took cough drops and tylenol which provided relief. He denies myalgias, fevers, chills. Reports sick contacts- coworker with a cold. Not up to date with covid or flu vaccine.     Review of Systems  As per HPI    Objective   /76 (BP Location: Left arm, Patient Position: Sitting, Cuff Size: Large)   Pulse (!) 115   Temp 98 °F (36.7 °C) (Temporal)   Resp 18   Ht 5' 9\" (1.753 m)   Wt 123 kg (272 lb 1.6 oz)   SpO2 96%   BMI 40.18 kg/m²      Physical Exam  Vitals and nursing note reviewed.   Constitutional:       General: He is not in acute distress.     Appearance: He is well-developed. He is obese.   HENT:      Head: Normocephalic and atraumatic.      Right Ear: External ear normal.      Left Ear: External ear normal.      Nose: Nose normal.   Eyes:      Conjunctiva/sclera: Conjunctivae normal.   Cardiovascular:      Rate and Rhythm: Normal rate and regular rhythm.      Pulses: Normal pulses.      Heart sounds: Normal heart sounds. No murmur heard.  Pulmonary:      Effort: Pulmonary effort is normal. No " respiratory distress.      Breath sounds: Normal breath sounds.   Abdominal:      Palpations: Abdomen is soft.      Tenderness: There is no abdominal tenderness.   Musculoskeletal:         General: No swelling. Normal range of motion.      Cervical back: Normal range of motion and neck supple.   Skin:     General: Skin is warm and dry.      Capillary Refill: Capillary refill takes less than 2 seconds.   Neurological:      General: No focal deficit present.      Mental Status: He is alert and oriented to person, place, and time. Mental status is at baseline.   Psychiatric:         Mood and Affect: Mood normal.         Behavior: Behavior normal.         Thought Content: Thought content normal.         Judgment: Judgment normal.

## 2024-12-10 NOTE — TELEPHONE ENCOUNTER
Pt called and left a VM      Called pt and it went to Vm. I left a message with our number to call us back

## 2024-12-11 ENCOUNTER — OFFICE VISIT (OUTPATIENT)
Dept: FAMILY MEDICINE CLINIC | Facility: CLINIC | Age: 39
End: 2024-12-11

## 2024-12-11 VITALS
HEIGHT: 69 IN | RESPIRATION RATE: 18 BRPM | WEIGHT: 272.1 LBS | HEART RATE: 115 BPM | DIASTOLIC BLOOD PRESSURE: 76 MMHG | OXYGEN SATURATION: 96 % | BODY MASS INDEX: 40.3 KG/M2 | TEMPERATURE: 98 F | SYSTOLIC BLOOD PRESSURE: 118 MMHG

## 2024-12-11 DIAGNOSIS — J06.9 VIRAL UPPER RESPIRATORY ILLNESS: Primary | ICD-10-CM

## 2024-12-11 LAB
SARS-COV-2 AG UPPER RESP QL IA: NEGATIVE
SL AMB POCT RAPID FLU A: NEGATIVE
SL AMB POCT RAPID FLU B: NEGATIVE
VALID CONTROL: NORMAL

## 2024-12-11 PROCEDURE — 87804 INFLUENZA ASSAY W/OPTIC: CPT

## 2024-12-11 PROCEDURE — 87070 CULTURE OTHR SPECIMN AEROBIC: CPT

## 2024-12-11 PROCEDURE — 99213 OFFICE O/P EST LOW 20 MIN: CPT

## 2024-12-11 PROCEDURE — G2211 COMPLEX E/M VISIT ADD ON: HCPCS

## 2024-12-11 PROCEDURE — 87811 SARS-COV-2 COVID19 W/OPTIC: CPT

## 2024-12-11 RX ORDER — FLUTICASONE PROPIONATE 50 MCG
1 SPRAY, SUSPENSION (ML) NASAL DAILY
Qty: 18 ML | Refills: 3 | Status: SHIPPED | OUTPATIENT
Start: 2024-12-11

## 2024-12-12 ENCOUNTER — RESULTS FOLLOW-UP (OUTPATIENT)
Dept: FAMILY MEDICINE CLINIC | Facility: CLINIC | Age: 39
End: 2024-12-12

## 2024-12-13 LAB — BACTERIA THROAT CULT: NORMAL

## 2024-12-19 ENCOUNTER — OFFICE VISIT (OUTPATIENT)
Dept: FAMILY MEDICINE CLINIC | Facility: CLINIC | Age: 39
End: 2024-12-19

## 2024-12-19 VITALS
WEIGHT: 274.2 LBS | DIASTOLIC BLOOD PRESSURE: 82 MMHG | SYSTOLIC BLOOD PRESSURE: 130 MMHG | OXYGEN SATURATION: 94 % | TEMPERATURE: 96.6 F | RESPIRATION RATE: 14 BRPM | BODY MASS INDEX: 40.61 KG/M2 | HEART RATE: 107 BPM | HEIGHT: 69 IN

## 2024-12-19 DIAGNOSIS — J06.9 VIRAL URI: Primary | ICD-10-CM

## 2024-12-19 PROCEDURE — 99213 OFFICE O/P EST LOW 20 MIN: CPT | Performed by: PHYSICIAN ASSISTANT

## 2024-12-19 NOTE — PROGRESS NOTES
Name: Nic Humphrey Jr.      : 1985      MRN: [id number]  Encounter Provider: Christina Herrera PA-C  Encounter Date: 2024   Encounter department: [facility]  :  Assessment & Plan  Viral URI  - Discussed that symptoms are likely cause by virus.  Discussed importance of increasing fluid intake and getting plenty of rest.   -Reviewed throat culture from 2024.  Results show no growth.  -Reviewed flu A/flu B/COVID test from 2024.  All results are negative.  - Discussed supportive care with use of OTC cough drops and OTC cough syrup.  Symptoms may improve with home humidifier use or exposure to steam from hot shower.    - Continue to monitor symptoms closely and call the office if symptoms worsen or do not improve.    Orders:    dextromethorphan-guaifenesin (MUCINEX DM)  MG per 12 hr tablet; Take 1 tablet by mouth every 12 (twelve) hours           History of Present Illness     Patient is a 39 y.o. male whom  has a past medical history of Bipolar affective psychosis (HCC), Depression, Lumbar radiculopathy, Migraine, Obesity, and Sleep apnea. who is seen today in office for sore throat follow up.    -Patient notes he continues to have slight pain in his throat but overall his symptoms are improving.  Patient notes he does continue with some nasal congestion and cough.  Patient notes he has been using Vicks and DayQuil with little relief.  He denies any fevers, chills, headaches, dizziness, ear pain, shortness of breath, nausea, vomiting.      Review of Systems   Constitutional:  Negative for chills, fatigue and fever.   HENT:  Positive for sore throat (improving).    Respiratory:  Negative for shortness of breath.    Gastrointestinal:  Negative for abdominal pain, constipation and diarrhea.   Genitourinary:  Negative for dysuria.   Musculoskeletal:  Positive for arthralgias.   Skin:  Negative for rash.   Neurological:  Negative for dizziness.  "      Objective   /82 (BP Location: Left arm, Patient Position: Sitting, Cuff Size: Large)   Pulse (!) 107   Temp (!) 96.6 °F (35.9 °C) (Temporal)   Resp 14   Ht 5' 9\" (1.753 m)   Wt 124 kg (274 lb 3.2 oz)   SpO2 94%   BMI 40.49 kg/m²      Physical Exam  Vitals and nursing note reviewed.   Constitutional:       General: He is not in acute distress.     Appearance: Normal appearance. He is well-developed.   HENT:      Head: Normocephalic and atraumatic.      Right Ear: External ear normal.      Left Ear: External ear normal.      Nose: Nose normal.      Mouth/Throat:      Pharynx: Uvula midline.   Eyes:      Conjunctiva/sclera: Conjunctivae normal.   Cardiovascular:      Rate and Rhythm: Normal rate and regular rhythm.      Pulses: Normal pulses.      Heart sounds: Normal heart sounds. No murmur heard.  Pulmonary:      Effort: Pulmonary effort is normal. No respiratory distress.      Breath sounds: Normal breath sounds. No wheezing.   Musculoskeletal:      Cervical back: Normal range of motion and neck supple.   Skin:     General: Skin is warm and dry.      Capillary Refill: Capillary refill takes less than 2 seconds.   Neurological:      Mental Status: He is alert and oriented to person, place, and time.   Psychiatric:         Speech: Speech normal.         Behavior: Behavior normal.         "